# Patient Record
Sex: FEMALE | Race: ASIAN | NOT HISPANIC OR LATINO
[De-identification: names, ages, dates, MRNs, and addresses within clinical notes are randomized per-mention and may not be internally consistent; named-entity substitution may affect disease eponyms.]

---

## 2022-02-26 ENCOUNTER — ASOB RESULT (OUTPATIENT)
Age: 32
End: 2022-02-26

## 2022-02-26 ENCOUNTER — TRANSCRIPTION ENCOUNTER (OUTPATIENT)
Age: 32
End: 2022-02-26

## 2022-02-26 ENCOUNTER — APPOINTMENT (OUTPATIENT)
Dept: ANTEPARTUM | Facility: CLINIC | Age: 32
End: 2022-02-26
Payer: COMMERCIAL

## 2022-02-26 PROCEDURE — 76801 OB US < 14 WKS SINGLE FETUS: CPT

## 2022-02-26 PROCEDURE — 76813 OB US NUCHAL MEAS 1 GEST: CPT

## 2022-02-26 PROCEDURE — 36416 COLLJ CAPILLARY BLOOD SPEC: CPT

## 2022-03-11 PROBLEM — Z00.00 ENCOUNTER FOR PREVENTIVE HEALTH EXAMINATION: Status: ACTIVE | Noted: 2022-03-11

## 2022-04-26 ENCOUNTER — APPOINTMENT (OUTPATIENT)
Dept: ANTEPARTUM | Facility: CLINIC | Age: 32
End: 2022-04-26
Payer: COMMERCIAL

## 2022-04-26 ENCOUNTER — ASOB RESULT (OUTPATIENT)
Age: 32
End: 2022-04-26

## 2022-04-26 DIAGNOSIS — Z82.79 FAMILY HISTORY OF OTHER CONGENITAL MALFORMATIONS, DEFORMATIONS AND CHROMOSOMAL ABNORMALITIES: ICD-10-CM

## 2022-04-26 PROCEDURE — 76811 OB US DETAILED SNGL FETUS: CPT

## 2022-05-16 ENCOUNTER — OUTPATIENT (OUTPATIENT)
Dept: OUTPATIENT SERVICES | Age: 32
LOS: 1 days | Discharge: HOME | End: 2022-05-16

## 2022-05-18 ENCOUNTER — APPOINTMENT (OUTPATIENT)
Dept: PEDIATRIC CARDIOLOGY | Facility: CLINIC | Age: 32
End: 2022-05-18
Payer: COMMERCIAL

## 2022-05-18 ENCOUNTER — APPOINTMENT (OUTPATIENT)
Dept: PEDIATRIC CARDIOLOGY | Facility: CLINIC | Age: 32
End: 2022-05-18

## 2022-05-18 PROCEDURE — 76827 ECHO EXAM OF FETAL HEART: CPT

## 2022-05-18 PROCEDURE — 93325 DOPPLER ECHO COLOR FLOW MAPG: CPT | Mod: 59

## 2022-05-18 PROCEDURE — 76825 ECHO EXAM OF FETAL HEART: CPT

## 2022-05-18 PROCEDURE — 99203 OFFICE O/P NEW LOW 30 MIN: CPT

## 2022-05-18 PROCEDURE — 76820 UMBILICAL ARTERY ECHO: CPT

## 2022-06-15 ENCOUNTER — APPOINTMENT (OUTPATIENT)
Dept: MATERNAL FETAL MEDICINE | Facility: CLINIC | Age: 32
End: 2022-06-15
Payer: COMMERCIAL

## 2022-06-15 ENCOUNTER — ASOB RESULT (OUTPATIENT)
Age: 32
End: 2022-06-15

## 2022-06-15 DIAGNOSIS — O24.410 GESTATIONAL DIABETES MELLITUS IN PREGNANCY, DIET CONTROLLED: ICD-10-CM

## 2022-06-15 PROCEDURE — G0108 DIAB MANAGE TRN  PER INDIV: CPT | Mod: 95

## 2022-07-06 ENCOUNTER — APPOINTMENT (OUTPATIENT)
Dept: MATERNAL FETAL MEDICINE | Facility: CLINIC | Age: 32
End: 2022-07-06

## 2022-07-06 ENCOUNTER — ASOB RESULT (OUTPATIENT)
Age: 32
End: 2022-07-06

## 2022-07-06 PROCEDURE — G0108 DIAB MANAGE TRN  PER INDIV: CPT | Mod: 95

## 2022-07-27 ENCOUNTER — ASOB RESULT (OUTPATIENT)
Age: 32
End: 2022-07-27

## 2022-07-27 ENCOUNTER — APPOINTMENT (OUTPATIENT)
Dept: MATERNAL FETAL MEDICINE | Facility: CLINIC | Age: 32
End: 2022-07-27

## 2022-07-27 PROCEDURE — G0108 DIAB MANAGE TRN  PER INDIV: CPT | Mod: 95

## 2022-08-17 ENCOUNTER — APPOINTMENT (OUTPATIENT)
Dept: MATERNAL FETAL MEDICINE | Facility: CLINIC | Age: 32
End: 2022-08-17

## 2022-08-17 ENCOUNTER — ASOB RESULT (OUTPATIENT)
Age: 32
End: 2022-08-17

## 2022-08-17 PROCEDURE — G0108 DIAB MANAGE TRN  PER INDIV: CPT | Mod: 95

## 2022-08-28 ENCOUNTER — OUTPATIENT (OUTPATIENT)
Dept: INPATIENT UNIT | Facility: HOSPITAL | Age: 32
LOS: 1 days | Discharge: HOME | End: 2022-08-28

## 2022-08-28 VITALS
SYSTOLIC BLOOD PRESSURE: 94 MMHG | RESPIRATION RATE: 16 BRPM | TEMPERATURE: 99 F | DIASTOLIC BLOOD PRESSURE: 61 MMHG | HEART RATE: 72 BPM

## 2022-08-28 VITALS — HEART RATE: 82 BPM | SYSTOLIC BLOOD PRESSURE: 95 MMHG | DIASTOLIC BLOOD PRESSURE: 56 MMHG

## 2022-08-28 DIAGNOSIS — O26.899 OTHER SPECIFIED PREGNANCY RELATED CONDITIONS, UNSPECIFIED TRIMESTER: ICD-10-CM

## 2022-08-28 DIAGNOSIS — Z3A.00 WEEKS OF GESTATION OF PREGNANCY NOT SPECIFIED: ICD-10-CM

## 2022-08-28 PROCEDURE — 99213 OFFICE O/P EST LOW 20 MIN: CPT

## 2022-08-28 PROCEDURE — 76818 FETAL BIOPHYS PROFILE W/NST: CPT | Mod: 26

## 2022-08-28 NOTE — OB PROVIDER TRIAGE NOTE - HISTORY OF PRESENT ILLNESS
This is a 31 year old  at 37.5 weeks gestational age presents from [facility] for prolong monitoring due to nonreactive NST in office ( 1 10x10 accel as per RN report- no physical hardcopy to review) and DORON today on 7.9 ( previously 16 on ). Pt reports +GFM, denies LOF, VB or contractions.   AP course complicated by:  - GDMA1, well controlled as per patient, fasting FSs 70's, postprandial 100s, most recent efw 6 lbs last week  - Fetal Alert: Due to family history of congenital heart disease (previous child with VSD), a nonurgent  cardiology evaluation is recommended in 2-3 months after birth, sooner if there is a clinical concern.   - GBS neg    med: hitesh  surg: hitesh  gyn: coreyies  ob: 5/3/2009 37 week  6 lbs, NICU for VSD  current meds: pnv  NKDA  This is a 31 year old  at 37.5 weeks gestational age presents from [facility] for prolong monitoring due to nonreactive NST in office (only 1 10x10 accel as per RN report- no physical hardcopy to review) and DORON today on 7.9 ( previously 16 on ). Pt reports +GFM, denies LOF, VB or contractions.   AP course complicated by:  - GDMA1, well controlled as per patient, fasting FSs 70's, postprandial 100s, most recent efw 6 lbs last week  - Fetal Alert: Due to family history of congenital heart disease (previous child with VSD), a nonurgent  cardiology evaluation is recommended in 2-3 months after birth, sooner if there is a clinical concern.   - GBS neg    med: hitesh  surg: hitesh  gyn: hitesh  ob: 5/3/2009 37 week  6 lbs, NICU for VSD  current meds: pnv  NKDA

## 2022-08-28 NOTE — OB RN TRIAGE NOTE - NSMATERNALFETALCONCERNS_OBGYN_ALL_OB_FT
Fetal Alert  8.10.22: Due to familyhistory of congenital heart disease (previous child with VSD), a nonurgent  cardiology evaluation is recommended in 2-3 months after birth, sooner if there is a clinical concern. Elizabet Elliott RN

## 2022-08-28 NOTE — OB PROVIDER TRIAGE NOTE - NSHPPHYSICALEXAM_GEN_ALL_CORE
Vital Signs Last 24 Hrs  T(C): --  T(F): --  HR: 72 (28 Aug 2022 12:29) (72 - 72)  BP: 94/61 (28 Aug 2022 12:29) (94/61 - 94/61)  BP(mean): --  RR: --  SpO2: --    Parameters below as of 28 Aug 2022 11:56  Patient On (Oxygen Delivery Method): room air    A&O X3  CTAB  abdomen: gravid, soft, nontender  TAS: vtx anterior placenta bpp 8/8, jody 15.95  NST in progress Vital Signs Last 24 Hrs  T(C): --  T(F): --  HR: 72 (28 Aug 2022 12:29) (72 - 72)  BP: 94/61 (28 Aug 2022 12:29) (94/61 - 94/61)  BP(mean): --  RR: --  SpO2: --    Parameters below as of 28 Aug 2022 11:56  Patient On (Oxygen Delivery Method): room air    A&O X3  CTAB  abdomen: gravid, soft, nontender  TAS: vtx anterior placenta bpp 8/8, jody 15.95  NST in progress  130 baseline, moderate variability + accels, no decels, mild irregular contractions.

## 2022-08-28 NOTE — OB PROVIDER TRIAGE NOTE - ADDITIONAL INSTRUCTIONS
This is a 31 year old  at 37.5 weeks gestational age presents for prolong monitoring    Plan discussed with Dr Durbin  maternal/ fetal surveillance reassuring  DORON stable, NST reactive  labor precautions reviewed with patient  continue fetal kick counts, rest and activity as tolerated, increase PO fluid  follow up with [facility] as scheduled  pt verbalized understanding of instructions given  discharged home

## 2022-08-30 ENCOUNTER — OUTPATIENT (OUTPATIENT)
Dept: INPATIENT UNIT | Facility: HOSPITAL | Age: 32
LOS: 1 days | Discharge: HOME | End: 2022-08-30

## 2022-08-30 VITALS — DIASTOLIC BLOOD PRESSURE: 53 MMHG | HEART RATE: 69 BPM | SYSTOLIC BLOOD PRESSURE: 93 MMHG

## 2022-08-30 VITALS — TEMPERATURE: 98 F

## 2022-08-30 DIAGNOSIS — O26.899 OTHER SPECIFIED PREGNANCY RELATED CONDITIONS, UNSPECIFIED TRIMESTER: ICD-10-CM

## 2022-08-30 DIAGNOSIS — Z3A.00 WEEKS OF GESTATION OF PREGNANCY NOT SPECIFIED: ICD-10-CM

## 2022-08-30 NOTE — OB PROVIDER TRIAGE NOTE - NSOBPROVIDERNOTE_OBGYN_ALL_OB_FT
32 y/o  GDMA1 at 38 weeks contractions every 10minutes, R/O labor  - No evidence of labor, cervical exam 3  - Patient stable for discharge home with adequate outpatient follow up  - Instructed patient to follow up with OB/GYN as scheduled  - Educated and discussed labor precautions  - Return to L&D if contractions worsen and/or become more frequent  - Educated and discussed concerning signs/symptoms to call OB/GYN and return to L&D including but not limited to vaginal bleeding, leakage of fluid, painful contractions, decreased fetal movement, fever/chills, shortness of breath, chest pain, rash, difficulty ambulating, nausea/vomiting/diarrhea, worsening of symptoms  - Patient states she understands and agrees with assessment and plan, all questions answered  - Discussed with Dr. Garcia

## 2022-08-30 NOTE — OB PROVIDER TRIAGE NOTE - HISTORY OF PRESENT ILLNESS
32 y/o  GDMA1 at 38 weeks presents with contractions every 10 minutes since 3pm. Patient reports good fetal movement, denies leakage of fluid, vaginal bleeding. Denies any other AP issues or fetal issues.  GBS negative    Med Hx: denies  Meds: PNV, vitamin D, iron  OBGYN Hx: GDMA1   2019 FT 6 lbs  Surg Hx: denies  Allergies: denies

## 2022-08-30 NOTE — OB RN TRIAGE NOTE - FALL HARM RISK - UNIVERSAL INTERVENTIONS
Bed in lowest position, wheels locked, appropriate side rails in place/Call bell, personal items and telephone in reach/Instruct patient to call for assistance before getting out of bed or chair/Non-slip footwear when patient is out of bed/[place] to call system/Physically safe environment - no spills, clutter or unnecessary equipment/Purposeful Proactive Rounding/Room/bathroom lighting operational, light cord in reach

## 2022-08-30 NOTE — OB PROVIDER TRIAGE NOTE - NSHPPHYSICALEXAM_GEN_ALL_CORE
Vital Signs Last 24 Hrs  T(C): 36.6 (30 Aug 2022 05:07), Max: 36.6 (30 Aug 2022 05:02)  T(F): 97.9 (30 Aug 2022 05:07), Max: 97.9 (30 Aug 2022 05:07)  HR: 69 (30 Aug 2022 06:14) (68 - 69)  BP: 93/53 (30 Aug 2022 06:14) (93/53 - 97/55)  BP(mean): --  RR: 17 (30 Aug 2022 05:07) (17 - 17)  SpO2: --    Parameters below as of 30 Aug 2022 05:07  Patient On (Oxygen Delivery Method): room air    A&O x 3  Lungs: CTAB  Abd: gravid, soft nontender to palpation  EFM: [id number], moderate variability, + accels, no decels, ctx q10min  SVE: 1/30/-3  TAS: vertex presentation

## 2022-08-31 ENCOUNTER — INPATIENT (INPATIENT)
Facility: HOSPITAL | Age: 32
LOS: 1 days | Discharge: HOME | End: 2022-09-02
Attending: STUDENT IN AN ORGANIZED HEALTH CARE EDUCATION/TRAINING PROGRAM | Admitting: STUDENT IN AN ORGANIZED HEALTH CARE EDUCATION/TRAINING PROGRAM

## 2022-08-31 ENCOUNTER — TRANSCRIPTION ENCOUNTER (OUTPATIENT)
Age: 32
End: 2022-08-31

## 2022-08-31 VITALS
SYSTOLIC BLOOD PRESSURE: 118 MMHG | RESPIRATION RATE: 16 BRPM | HEART RATE: 71 BPM | TEMPERATURE: 98 F | DIASTOLIC BLOOD PRESSURE: 65 MMHG

## 2022-08-31 DIAGNOSIS — Z3A.00 WEEKS OF GESTATION OF PREGNANCY NOT SPECIFIED: ICD-10-CM

## 2022-08-31 DIAGNOSIS — O26.899 OTHER SPECIFIED PREGNANCY RELATED CONDITIONS, UNSPECIFIED TRIMESTER: ICD-10-CM

## 2022-08-31 LAB
BASOPHILS # BLD AUTO: 0.04 K/UL — SIGNIFICANT CHANGE UP (ref 0–0.2)
BASOPHILS NFR BLD AUTO: 0.4 % — SIGNIFICANT CHANGE UP (ref 0–2)
BLD GP AB SCN SERPL QL: NEGATIVE — SIGNIFICANT CHANGE UP
COVID-19 SPIKE DOMAIN AB INTERP: POSITIVE
COVID-19 SPIKE DOMAIN ANTIBODY RESULT: >250 U/ML — HIGH
EOSINOPHIL # BLD AUTO: 0.1 K/UL — SIGNIFICANT CHANGE UP (ref 0–0.5)
EOSINOPHIL NFR BLD AUTO: 0.9 % — SIGNIFICANT CHANGE UP (ref 0–6)
GLUCOSE BLDC GLUCOMTR-MCNC: 111 MG/DL — HIGH (ref 70–99)
HCT VFR BLD CALC: 38.6 % — SIGNIFICANT CHANGE UP (ref 34.5–45)
HGB BLD-MCNC: 12.7 G/DL — SIGNIFICANT CHANGE UP (ref 11.5–15.5)
IANC: 8.38 K/UL — HIGH (ref 1.8–7.4)
IMM GRANULOCYTES NFR BLD AUTO: 1.2 % — SIGNIFICANT CHANGE UP (ref 0–1.5)
LYMPHOCYTES # BLD AUTO: 1.43 K/UL — SIGNIFICANT CHANGE UP (ref 1–3.3)
LYMPHOCYTES # BLD AUTO: 13.5 % — SIGNIFICANT CHANGE UP (ref 13–44)
MCHC RBC-ENTMCNC: 32.9 GM/DL — SIGNIFICANT CHANGE UP (ref 32–36)
MCHC RBC-ENTMCNC: 33.9 PG — SIGNIFICANT CHANGE UP (ref 27–34)
MCV RBC AUTO: 102.9 FL — HIGH (ref 80–100)
MONOCYTES # BLD AUTO: 0.52 K/UL — SIGNIFICANT CHANGE UP (ref 0–0.9)
MONOCYTES NFR BLD AUTO: 4.9 % — SIGNIFICANT CHANGE UP (ref 2–14)
NEUTROPHILS # BLD AUTO: 8.38 K/UL — HIGH (ref 1.8–7.4)
NEUTROPHILS NFR BLD AUTO: 79.1 % — HIGH (ref 43–77)
NRBC # BLD: 0 /100 WBCS — SIGNIFICANT CHANGE UP (ref 0–0)
NRBC # FLD: 0 K/UL — SIGNIFICANT CHANGE UP (ref 0–0)
PLATELET # BLD AUTO: 159 K/UL — SIGNIFICANT CHANGE UP (ref 150–400)
RBC # BLD: 3.75 M/UL — LOW (ref 3.8–5.2)
RBC # FLD: 13.2 % — SIGNIFICANT CHANGE UP (ref 10.3–14.5)
RH IG SCN BLD-IMP: POSITIVE — SIGNIFICANT CHANGE UP
RH IG SCN BLD-IMP: POSITIVE — SIGNIFICANT CHANGE UP
SARS-COV-2 IGG+IGM SERPL QL IA: >250 U/ML — HIGH
SARS-COV-2 IGG+IGM SERPL QL IA: POSITIVE
SARS-COV-2 RNA SPEC QL NAA+PROBE: SIGNIFICANT CHANGE UP
T PALLIDUM AB TITR SER: NEGATIVE — SIGNIFICANT CHANGE UP
WBC # BLD: 10.6 K/UL — HIGH (ref 3.8–10.5)
WBC # FLD AUTO: 10.6 K/UL — HIGH (ref 3.8–10.5)

## 2022-08-31 RX ORDER — HYDROCORTISONE 1 %
1 OINTMENT (GRAM) TOPICAL EVERY 6 HOURS
Refills: 0 | Status: DISCONTINUED | OUTPATIENT
Start: 2022-08-31 | End: 2022-09-02

## 2022-08-31 RX ORDER — BENZOCAINE 10 %
1 GEL (GRAM) MUCOUS MEMBRANE EVERY 6 HOURS
Refills: 0 | Status: DISCONTINUED | OUTPATIENT
Start: 2022-08-31 | End: 2022-09-02

## 2022-08-31 RX ORDER — OXYTOCIN 10 UNIT/ML
333.33 VIAL (ML) INJECTION
Qty: 20 | Refills: 0 | Status: COMPLETED | OUTPATIENT
Start: 2022-08-31 | End: 2022-08-31

## 2022-08-31 RX ORDER — LANOLIN
1 OINTMENT (GRAM) TOPICAL EVERY 6 HOURS
Refills: 0 | Status: DISCONTINUED | OUTPATIENT
Start: 2022-08-31 | End: 2022-09-02

## 2022-08-31 RX ORDER — AER TRAVELER 0.5 G/1
1 SOLUTION RECTAL; TOPICAL EVERY 4 HOURS
Refills: 0 | Status: DISCONTINUED | OUTPATIENT
Start: 2022-08-31 | End: 2022-09-02

## 2022-08-31 RX ORDER — MAGNESIUM HYDROXIDE 400 MG/1
30 TABLET, CHEWABLE ORAL
Refills: 0 | Status: DISCONTINUED | OUTPATIENT
Start: 2022-08-31 | End: 2022-09-02

## 2022-08-31 RX ORDER — SODIUM CHLORIDE 9 MG/ML
3 INJECTION INTRAMUSCULAR; INTRAVENOUS; SUBCUTANEOUS EVERY 8 HOURS
Refills: 0 | Status: DISCONTINUED | OUTPATIENT
Start: 2022-08-31 | End: 2022-09-02

## 2022-08-31 RX ORDER — DIPHENHYDRAMINE HCL 50 MG
25 CAPSULE ORAL EVERY 6 HOURS
Refills: 0 | Status: DISCONTINUED | OUTPATIENT
Start: 2022-08-31 | End: 2022-09-02

## 2022-08-31 RX ORDER — DIBUCAINE 1 %
1 OINTMENT (GRAM) RECTAL EVERY 6 HOURS
Refills: 0 | Status: DISCONTINUED | OUTPATIENT
Start: 2022-08-31 | End: 2022-09-02

## 2022-08-31 RX ORDER — CHLORHEXIDINE GLUCONATE 213 G/1000ML
1 SOLUTION TOPICAL ONCE
Refills: 0 | Status: DISCONTINUED | OUTPATIENT
Start: 2022-08-31 | End: 2022-08-31

## 2022-08-31 RX ORDER — PRAMOXINE HYDROCHLORIDE 150 MG/15G
1 AEROSOL, FOAM RECTAL EVERY 4 HOURS
Refills: 0 | Status: DISCONTINUED | OUTPATIENT
Start: 2022-08-31 | End: 2022-09-02

## 2022-08-31 RX ORDER — SIMETHICONE 80 MG/1
80 TABLET, CHEWABLE ORAL EVERY 4 HOURS
Refills: 0 | Status: DISCONTINUED | OUTPATIENT
Start: 2022-08-31 | End: 2022-09-02

## 2022-08-31 RX ORDER — CITRIC ACID/SODIUM CITRATE 300-500 MG
15 SOLUTION, ORAL ORAL EVERY 6 HOURS
Refills: 0 | Status: DISCONTINUED | OUTPATIENT
Start: 2022-08-31 | End: 2022-08-31

## 2022-08-31 RX ORDER — SODIUM CHLORIDE 9 MG/ML
1000 INJECTION, SOLUTION INTRAVENOUS ONCE
Refills: 0 | Status: COMPLETED | OUTPATIENT
Start: 2022-08-31 | End: 2022-08-31

## 2022-08-31 RX ORDER — TETANUS TOXOID, REDUCED DIPHTHERIA TOXOID AND ACELLULAR PERTUSSIS VACCINE, ADSORBED 5; 2.5; 8; 8; 2.5 [IU]/.5ML; [IU]/.5ML; UG/.5ML; UG/.5ML; UG/.5ML
0.5 SUSPENSION INTRAMUSCULAR ONCE
Refills: 0 | Status: DISCONTINUED | OUTPATIENT
Start: 2022-08-31 | End: 2022-09-02

## 2022-08-31 RX ORDER — SODIUM CHLORIDE 9 MG/ML
1000 INJECTION, SOLUTION INTRAVENOUS
Refills: 0 | Status: DISCONTINUED | OUTPATIENT
Start: 2022-08-31 | End: 2022-08-31

## 2022-08-31 RX ADMIN — SODIUM CHLORIDE 2000 MILLILITER(S): 9 INJECTION, SOLUTION INTRAVENOUS at 22:45

## 2022-08-31 RX ADMIN — SODIUM CHLORIDE 3 MILLILITER(S): 9 INJECTION INTRAMUSCULAR; INTRAVENOUS; SUBCUTANEOUS at 22:45

## 2022-08-31 RX ADMIN — Medication 1000 MILLIUNIT(S)/MIN: at 14:43

## 2022-08-31 NOTE — DISCHARGE NOTE OB - NS MD DC FALL RISK RISK
For information on Fall & Injury Prevention, visit: [email or web address] OR  [email or web address] OR  https://www.cdc.gov/steadi/patient.html

## 2022-08-31 NOTE — OB PROVIDER TRIAGE NOTE - NS_OBGYNHISTORY_OBGYN_ALL_OB_FT
2019 FT 6 lbs- baby with small VSD- no surgery    AP course complicated by:  GDMA1  Fetal echo WNL- post  follow up in 2-3 weeks  GBS negative 2022  EFW 6lbs 2 weeks ago

## 2022-08-31 NOTE — OB RN TRIAGE NOTE - FALL HARM RISK - UNIVERSAL INTERVENTIONS
Bed in lowest position, wheels locked, appropriate side rails in place/Call bell, personal items and telephone in reach/Instruct patient to call for assistance before getting out of bed or chair/Non-slip footwear when patient is out of bed/[place] to call system/Physically safe environment - no spills, clutter or unnecessary equipment/Purposeful Proactive Rounding/Room/bathroom lighting operational, light cord in reach

## 2022-08-31 NOTE — OB RN DELIVERY SUMMARY - BABY A: APGAR 5 MIN COLOR, DELIVERY
RN received report assumed patient care. Patient is sleeping at this time. Cardiac monitor in place. Octreotide gtt infusing. Patient is in bilateral wrist restraints due to attempts to pull at vital lines. Cortrak in place, pt has been NPO since midnight for possible procedure. RN will round on pt frequently due to confusion and restraints.    (1) body pink, extremities blue

## 2022-08-31 NOTE — OB PROVIDER H&P - NSHPPHYSICALEXAM_GEN_ALL_CORE
Assessment reveals VSS, abdomen soft, NT, gravid.   VE 7/90/-2  Vtx confirmed by sono  Cat 1 FHT, ctx 2-5 on toco  Patient desires epidural

## 2022-08-31 NOTE — DISCHARGE NOTE OB - HOSPITAL COURSE
precipitous Spontaneous vaginal delivery of viable liveborn male infant.   Head, shoulders, and body delivered without complications. Cord around the neck x 2.   Infant was suctioned and passed to mother.   Delayed cord clamping performed, then clamped and cut.   Placenta delivered intact with a 3-vessel cord.   Fundal massage was given and uterine fundus was noted to be firm.   Vaginal exam revealed an intact cervix, vaginal walls, and sulci.   Patient has a 2nd degree laceration that was repaired with 2-0 chromic suture.   Excellent hemostasis noted.  Patient was stable and started postpartum recovery in room.   Count was correct x 2.     Infant: male  Apgar: 8/9  EBL: 213ml  weight: 3125g. 6#14

## 2022-08-31 NOTE — OB NEONATOLOGY/PEDIATRICIAN DELIVERY SUMMARY - NSPEDSNEONOTESA_OBGYN_ALL_OB_FT
Called by OBGYN to attend  due to cat II FHT. Baby is product of a 38.1 week gestation born to a  31 y.o. F. Maternal labs include Blood Type A+, GBS-, Hep B-, RPR-, Rubella imm, HIV-. Maternal history is non-significant though previous child with VSD. Fetal echo WNL. Pregnancy was complicated by GDM. AROM on 22 at 1120 with clear fluid, at approximately 1 hour. Delivery uncomplicated. Baby emerged slightly depressed but became vigorous with stimulation. Delayed cord clamping x30s performed. Resuscitation included w/d/s/s. Apgars were 8/9. EOS score: 0.05. Feeding: Breast and bottle. Consents to hep B. Declines circumcision. Admit to NBN.

## 2022-08-31 NOTE — OB PROVIDER H&P - ASSESSMENT
Admit for labor  Epidural for pain management  COVID-19 PCR  Expectant management at this time  EFM/TOCO  D/W Dr. Torres

## 2022-08-31 NOTE — CHART NOTE - NSCHARTNOTEFT_GEN_A_CORE
pt was evaluated at bedside   ve: 7/90/-2 AROM clear   cat 1 tracing   pitocin augmentation prn   peanut   anticipate vaginal delivery

## 2022-08-31 NOTE — OB RN PATIENT PROFILE - NS_CIRCUMCISIONPLAN_OBGYN_ALL_OB
conducted a Emergency Department visit for Jorge Sauceda, who is a 82 y.o.,female. The  provided the following Interventions:  Continued the relationship of care and support. Listened empathically. Offered prayer and assurance of continued prayer on patients behalf. Chart reviewed. The following outcomes were achieved:  Patient shared that family had just left. Patient expressed gratitude for 's visit. Plan:  Chaplains will continue to follow and will provide pastoral care on an as needed/requested basis.  recommends bedside caregivers page  on duty if patient shows signs of acute spiritual or emotional distress.        [facility]  [phone] No

## 2022-08-31 NOTE — OB RN DELIVERY SUMMARY - NS_SEPSISRSKCALC_OBGYN_ALL_OB_FT
EOS calculated successfully. EOS Risk Factor: 0.5/1000 live births ([facility] national incidence); GA=38w1d; Temp=98.42; ROM=0.65; GBS='Negative'; Antibiotics='No antibiotics or any antibiotics < 2 hrs prior to birth'

## 2022-08-31 NOTE — DISCHARGE NOTE OB - MEDICATION SUMMARY - MEDICATIONS TO TAKE
I will START or STAY ON the medications listed below when I get home from the hospital:    acetaminophen 325 mg oral tablet  -- 3 tab(s) by mouth every 6 hours, As Needed  -- Indication: For pain    ibuprofen 600 mg oral tablet  -- 1 tab(s) by mouth every 6 hours, As Needed  -- Indication: For pain    benzocaine 20% topical spray  -- 1 spray(s) on skin every 6 hours, As needed, for Perineal discomfort  -- Indication: For Spontaneous vaginal delivery    dibucaine 1% topical ointment  -- 1 application on skin every 6 hours, As needed, Perineal discomfort  -- Indication: For Spontaneous vaginal delivery    lanolin topical ointment  -- 1 application on skin every 6 hours, As needed, nipple soreness  -- Indication: For Nipple soreness    hydrocortisone 1% topical cream  -- 1 application on skin every 6 hours, As needed, Moderate Pain (4-6)  -- Indication: For Spontaneous vaginal delivery    pramoxine 1% topical cream  -- 1 application on skin every 4 hours, As needed, Moderate Pain (4-6)  -- Indication: For Spontaneous vaginal delivery    witch hazel 50% topical pad  -- 1 application on skin every 4 hours, As needed, Perineal discomfort  -- Indication: For Spontaneous vaginal delivery

## 2022-08-31 NOTE — OB PROVIDER TRIAGE NOTE - HISTORY OF PRESENT ILLNESS
32yo  @ 38.1 presents with c/o painful contractions every 5 minutes. Denies LOF, VB and reports GFM.  Fully vaccinated for COVID-19    Denies PMH/PSH

## 2022-08-31 NOTE — OB RN PATIENT PROFILE - FALL HARM RISK - UNIVERSAL INTERVENTIONS
Bed in lowest position, wheels locked, appropriate side rails in place/Call bell, personal items and telephone in reach/Instruct patient to call for assistance before getting out of bed or chair/Non-slip footwear when patient is out of bed/[place] to call system/Physically safe environment - no spills, clutter or unnecessary equipment/Purposeful Proactive Rounding/Room/bathroom lighting operational, light cord in reach

## 2022-08-31 NOTE — OB PROVIDER DELIVERY SUMMARY - NSMATERNALFETALCONCERNS_OBGYN_ALL_OB_FT
Fetal Alert  8.10.22: Due to familyhistory of congenital heart disease (previous child with VSD), a nonurgent  cardiology evaluation is recommended in 2-3 months after birth, sooner if there is a clinical concern. Elizabet Elliott RN    
5

## 2022-08-31 NOTE — OB RN DELIVERY SUMMARY - NS_RNDELIVATTEST_OBGYN_ALL_OB
ambulatory OB Provider reported that the vagina was inspected and no foreign body was found/Laps, needles and instrument count was correct

## 2022-08-31 NOTE — DISCHARGE NOTE OB - MATERIALS PROVIDED
[facility] [place] Screening Program/[place]  Immunization Record/Breastfeeding Log/Bottle Feeding Log/Guide to Postpartum Care/[facility] Hearing Screen Program/Back To Sleep Handout/Shaken Baby Prevention Handout/Breastfeeding Guide and Packet/Birth Certificate Instructions/Discharge Medication Information for Patients and Families Pocket Guide

## 2022-08-31 NOTE — OB RN DELIVERY SUMMARY - NSSELHIDDEN_OBGYN_ALL_OB_FT
[NS_DeliveryAttending1_OBGYN_ALL_OB_FT:[email or web address]=],[NS_DeliveryRN_OBGYN_ALL_OB_FT:[id number]]

## 2022-08-31 NOTE — OB PROVIDER H&P - HISTORY OF PRESENT ILLNESS
30yo  @ 38.1 presents with c/o painful contractions every 5 minutes. Denies LOF, VB and reports GFM.  Fully vaccinated for COVID-19    Denies PMH/PSH

## 2022-08-31 NOTE — DISCHARGE NOTE OB - CARE PLAN
Principal Discharge DX:	Spontaneous vaginal delivery  Assessment and plan of treatment:	routine pp care   1

## 2022-08-31 NOTE — OB RN TRIAGE NOTE - NSCTXPAIN_OBGYN_ALL_OB
Assumed care of pt. Pt resting comfortably on stretcher. +nasal congestion. Pt able to move neck without difficulty. Education: Educated mom on Amoxicillin dosage and frequency. Mom verbalized understanding. Pt discharged home with parent/guardian. Pt acting age appropriately, respirations regular and unlabored, cap refill less than two seconds. Parent/guardian verbalized understanding of discharge paperwork and has no further questions at this time. Yes Alert-The patient is alert, awake and responds to voice. The patient is oriented to time, place, and person. The triage nurse is able to obtain subjective information.

## 2022-08-31 NOTE — DISCHARGE NOTE OB - PATIENT PORTAL LINK FT
You can access the FollowMyHealth Patient Portal offered by [facility] by registering at the following website: [email or web address]. By joining [facility]’s FollowMyHealth portal, you will also be able to view your health information using other applications (apps) compatible with our system.

## 2022-08-31 NOTE — OB RN PATIENT PROFILE - PRO PRENATAL LABS ORI SOURCE HIV
I spoke with pt wife. She stated that he has not had any CP. She states its theSOB or the fatigue which is so unlike him. She knows that something is wrong. He has not improved since he came out of the hospital. They would like the results to the monitor and see if appt can be moved up.  Wife wants it after May 23rd hard copy, drawn during this pregnancy

## 2022-08-31 NOTE — OB PROVIDER DELIVERY SUMMARY - NSSELHIDDEN_OBGYN_ALL_OB_FT
[NS_DeliveryAttending1_OBGYN_ALL_OB_FT:[email or web address]=],[NS_DeliveryRN_OBGYN_ALL_OB_FT:[id number]],[NS_DeliveryAssist1_OBGYN_ALL_OB_FT:[id number]]

## 2022-08-31 NOTE — DISCHARGE NOTE OB - CARE PROVIDER_API CALL
Katya Torres)  Obstetrics and Gynecology  [address] [place] [address]  Phone: [phone]  Fax: [phone]  Follow Up Time:

## 2022-08-31 NOTE — OB PROVIDER H&P - LABOR: CERVICAL POSITION
Spoke with pt and let her know that Dr. Amy Nagy recommends her to see an orthopedic doctor regarding her neck pain. She lives in [address] [place] [address] so she asked for a generalized orthopedic referral, and she will call her insurance company to see who is covered and take it there. There referral will be mailed to her home.
middle

## 2022-09-01 LAB
BASOPHILS # BLD AUTO: 0.04 K/UL — SIGNIFICANT CHANGE UP (ref 0–0.2)
BASOPHILS # BLD AUTO: 0.05 K/UL — SIGNIFICANT CHANGE UP (ref 0–0.2)
BASOPHILS NFR BLD AUTO: 0.3 % — SIGNIFICANT CHANGE UP (ref 0–2)
BASOPHILS NFR BLD AUTO: 0.4 % — SIGNIFICANT CHANGE UP (ref 0–2)
EOSINOPHIL # BLD AUTO: 0.15 K/UL — SIGNIFICANT CHANGE UP (ref 0–0.5)
EOSINOPHIL # BLD AUTO: 0.19 K/UL — SIGNIFICANT CHANGE UP (ref 0–0.5)
EOSINOPHIL NFR BLD AUTO: 1.2 % — SIGNIFICANT CHANGE UP (ref 0–6)
EOSINOPHIL NFR BLD AUTO: 1.5 % — SIGNIFICANT CHANGE UP (ref 0–6)
HCT VFR BLD CALC: 34 % — LOW (ref 34.5–45)
HCT VFR BLD CALC: 35.2 % — SIGNIFICANT CHANGE UP (ref 34.5–45)
HCT VFR BLD CALC: 35.2 % — SIGNIFICANT CHANGE UP (ref 34.5–45)
HGB BLD-MCNC: 11.3 G/DL — LOW (ref 11.5–15.5)
HGB BLD-MCNC: 11.7 G/DL — SIGNIFICANT CHANGE UP (ref 11.5–15.5)
HGB BLD-MCNC: 12.2 G/DL — SIGNIFICANT CHANGE UP (ref 11.5–15.5)
IANC: 9.17 K/UL — HIGH (ref 1.8–7.4)
IANC: 9.49 K/UL — HIGH (ref 1.8–7.4)
IMM GRANULOCYTES NFR BLD AUTO: 0.6 % — SIGNIFICANT CHANGE UP (ref 0–1.5)
IMM GRANULOCYTES NFR BLD AUTO: 0.8 % — SIGNIFICANT CHANGE UP (ref 0–1.5)
LYMPHOCYTES # BLD AUTO: 16.1 % — SIGNIFICANT CHANGE UP (ref 13–44)
LYMPHOCYTES # BLD AUTO: 18.9 % — SIGNIFICANT CHANGE UP (ref 13–44)
LYMPHOCYTES # BLD AUTO: 2.03 K/UL — SIGNIFICANT CHANGE UP (ref 1–3.3)
LYMPHOCYTES # BLD AUTO: 2.44 K/UL — SIGNIFICANT CHANGE UP (ref 1–3.3)
MCHC RBC-ENTMCNC: 32.1 GM/DL — SIGNIFICANT CHANGE UP (ref 32–36)
MCHC RBC-ENTMCNC: 33.2 PG — SIGNIFICANT CHANGE UP (ref 27–34)
MCHC RBC-ENTMCNC: 34.7 GM/DL — SIGNIFICANT CHANGE UP (ref 32–36)
MCHC RBC-ENTMCNC: 34.9 PG — HIGH (ref 27–34)
MCV RBC AUTO: 100.6 FL — HIGH (ref 80–100)
MCV RBC AUTO: 103.5 FL — HIGH (ref 80–100)
MONOCYTES # BLD AUTO: 0.8 K/UL — SIGNIFICANT CHANGE UP (ref 0–0.9)
MONOCYTES # BLD AUTO: 0.99 K/UL — HIGH (ref 0–0.9)
MONOCYTES NFR BLD AUTO: 6.3 % — SIGNIFICANT CHANGE UP (ref 2–14)
MONOCYTES NFR BLD AUTO: 7.7 % — SIGNIFICANT CHANGE UP (ref 2–14)
NEUTROPHILS # BLD AUTO: 9.17 K/UL — HIGH (ref 1.8–7.4)
NEUTROPHILS # BLD AUTO: 9.49 K/UL — HIGH (ref 1.8–7.4)
NEUTROPHILS NFR BLD AUTO: 70.8 % — SIGNIFICANT CHANGE UP (ref 43–77)
NEUTROPHILS NFR BLD AUTO: 75.4 % — SIGNIFICANT CHANGE UP (ref 43–77)
NRBC # BLD: 0 /100 WBCS — SIGNIFICANT CHANGE UP (ref 0–0)
NRBC # BLD: 0 /100 WBCS — SIGNIFICANT CHANGE UP (ref 0–0)
NRBC # FLD: 0 K/UL — SIGNIFICANT CHANGE UP (ref 0–0)
NRBC # FLD: 0 K/UL — SIGNIFICANT CHANGE UP (ref 0–0)
PLATELET # BLD AUTO: 164 K/UL — SIGNIFICANT CHANGE UP (ref 150–400)
PLATELET # BLD AUTO: 177 K/UL — SIGNIFICANT CHANGE UP (ref 150–400)
RBC # BLD: 3.4 M/UL — LOW (ref 3.8–5.2)
RBC # BLD: 3.5 M/UL — LOW (ref 3.8–5.2)
RBC # FLD: 13 % — SIGNIFICANT CHANGE UP (ref 10.3–14.5)
RBC # FLD: 13 % — SIGNIFICANT CHANGE UP (ref 10.3–14.5)
WBC # BLD: 12.6 K/UL — HIGH (ref 3.8–10.5)
WBC # BLD: 12.93 K/UL — HIGH (ref 3.8–10.5)
WBC # FLD AUTO: 12.6 K/UL — HIGH (ref 3.8–10.5)
WBC # FLD AUTO: 12.93 K/UL — HIGH (ref 3.8–10.5)

## 2022-09-01 RX ORDER — IBUPROFEN 200 MG
600 TABLET ORAL EVERY 6 HOURS
Refills: 0 | Status: COMPLETED | OUTPATIENT
Start: 2022-09-01 | End: 2023-07-31

## 2022-09-01 RX ORDER — OXYCODONE HYDROCHLORIDE 5 MG/1
5 TABLET ORAL ONCE
Refills: 0 | Status: DISCONTINUED | OUTPATIENT
Start: 2022-09-01 | End: 2022-09-02

## 2022-09-01 RX ORDER — ACETAMINOPHEN 500 MG
975 TABLET ORAL
Refills: 0 | Status: DISCONTINUED | OUTPATIENT
Start: 2022-09-01 | End: 2022-09-02

## 2022-09-01 RX ORDER — OXYCODONE HYDROCHLORIDE 5 MG/1
5 TABLET ORAL
Refills: 0 | Status: DISCONTINUED | OUTPATIENT
Start: 2022-09-01 | End: 2022-09-02

## 2022-09-01 RX ADMIN — SODIUM CHLORIDE 3 MILLILITER(S): 9 INJECTION INTRAMUSCULAR; INTRAVENOUS; SUBCUTANEOUS at 20:53

## 2022-09-01 RX ADMIN — Medication 1 TABLET(S): at 12:38

## 2022-09-01 RX ADMIN — SODIUM CHLORIDE 3 MILLILITER(S): 9 INJECTION INTRAMUSCULAR; INTRAVENOUS; SUBCUTANEOUS at 06:23

## 2022-09-01 RX ADMIN — Medication 975 MILLIGRAM(S): at 23:00

## 2022-09-01 RX ADMIN — Medication 0.2 MILLIGRAM(S): at 23:00

## 2022-09-01 NOTE — PROGRESS NOTE ADULT - SUBJECTIVE AND OBJECTIVE BOX
Post-partum Note,   She is a  31y woman who is now post-partum day: 1    Subjective:  The patient reports dizziness since delivery.   Denies headaches. Denies abd pain. Denies vision changes.   She is ambulating.   She is tolerating regular diet.  She denies nausea and vomiting; denies fever.  She is voiding.  Her pain is controlled.  She reports normal postpartum bleeding.  She is breastfeeding and bottle feeding.     Physical exam:    Vital Signs Last 24 Hrs  T(C): 36.7 (01 Sep 2022 05:50), Max: 36.8 (31 Aug 2022 18:13)  T(F): 98 (01 Sep 2022 05:50), Max: 98.3 (31 Aug 2022 18:13)  HR: 68 (01 Sep 2022 05:50) (57 - 163)  BP: 90/65 (01 Sep 2022 05:50) (81/51 - 104/76)  BP(mean): --  RR: 18 (01 Sep 2022 05:50) (16 - 18)  SpO2: 100% (01 Sep 2022 05:50) (92% - 100%)    Parameters below as of 01 Sep 2022 05:50  Patient On (Oxygen Delivery Method): room air        Gen: NAD  Breast: Soft, nontender, not engorged.  Abdomen: Soft, nontender, no distension , firm uterine fundus at umbilicus.  Pelvic: Normal lochia noted  Ext: No calf tenderness    LABS:                        11.7   x     )-----------( x        ( 01 Sep 2022 05:46 )             34.0       Rubella status:     Allergies    No Known Allergies    Intolerances      MEDICATIONS  (STANDING):  diphtheria/tetanus/pertussis (acellular) Vaccine (ADAcel) 0.5 milliLiter(s) IntraMuscular once  prenatal multivitamin 1 Tablet(s) Oral daily  sodium chloride 0.9% lock flush 3 milliLiter(s) IV Push every 8 hours    MEDICATIONS  (PRN):  benzocaine 20%/menthol 0.5% Spray 1 Spray(s) Topical every 6 hours PRN for Perineal discomfort  dibucaine 1% Ointment 1 Application(s) Topical every 6 hours PRN Perineal discomfort  diphenhydrAMINE 25 milliGRAM(s) Oral every 6 hours PRN Pruritus  hydrocortisone 1% Cream 1 Application(s) Topical every 6 hours PRN Moderate Pain (4-6)  lanolin Ointment 1 Application(s) Topical every 6 hours PRN nipple soreness  magnesium hydroxide Suspension 30 milliLiter(s) Oral two times a day PRN Constipation  pramoxine 1%/zinc 5% Cream 1 Application(s) Topical every 4 hours PRN Moderate Pain (4-6)  simethicone 80 milliGRAM(s) Chew every 4 hours PRN Gas  witch hazel Pads 1 Application(s) Topical every 4 hours PRN Perineal discomfort        Assessment and Plan  PPD #1 s/p  reporting dizziness      PP & PPD Instructions reviewed.  PP education materials provided.   Increase po hydration.   Small frequent meals.   Repeat CBC ordered.  Plan for D/C home tomorrow, pending CBC and patient status.   Plan reviewed with Dr. Torres.

## 2022-09-01 NOTE — PROVIDER CONTACT NOTE (OTHER) - ASSESSMENT
Fundus firm at umbilicus with light bleeding noted on pad. Pt denies any chest pain, shortness of breath or palpitations at this time.

## 2022-09-01 NOTE — PROVIDER CONTACT NOTE (OTHER) - ACTION/TREATMENT ORDERED:
Fast scan performed. Manual removal of clots.  VS [id number]. Given Methergine series per order

## 2022-09-01 NOTE — PROVIDER CONTACT NOTE (OTHER) - SITUATION
Patient complained of dizziness. BP check 95/66. Patient instructed to call for assistance. Patient stated she had blood clot in her pad around 4:30pm but she did not tell anybody until now

## 2022-09-01 NOTE — CHART NOTE - NSCHARTNOTEFT_GEN_A_CORE
32yo PPD#1 from  reporting passage of a large clot. Provider made aware. Provider immediately at bedside.     Pt states she has not been soaking >1 pad/hour. States she passed a "baseball" sized clot @5p on 22. Reports she was lying down, using her cellphone when she experienced sudden onset dizziness. She attempted to stabilize herself by repositioning when she felt a gush from her vagina. Pt denies HA, CP, SOB, N/V/D, lightheadedness, or syncope.    Vital Signs Last 24 Hrs  T(C): 36.3 (22 @ 22:07), Max: 36.8 (22 @ 18:18)  T(F): 97.3 (22 @ 22:07), Max: 98.3 (22 @ 18:18)  HR: 65 (22 @ 22:07) (60 - 76)  BP: 106/69 (22 @ 22:07) (89/56 - 106/69)  BP(mean): --  RR: 16 (22 @ 22:07) (16 - 18)  SpO2: 100% (22 @ 22:07) (99% - 100%)    Orthostatic VS  22 @ 21:57  Lying BP: 86/46 HR: 62  Sitting BP: 96/55 HR: 65  Standing BP: 94/54 HR: 73  Site: upper right arm  Mode: electronic  Orthostatic VS  22 @ 13:18  Lying BP: 93/60 HR: 65  Sitting BP: 97/57 HR: 66  Standing BP: 88/54 HR: 88    CBC Full  -  ( 01 Sep 2022 22:00 )  WBC Count : 12.93 K/uL  RBC Count : 3.40 M/uL  Hemoglobin : 11.3 g/dL  Hematocrit : 35.2 %  Platelet Count - Automated : 164 K/uL  Mean Cell Volume : 103.5 fL  Mean Cell Hemoglobin : 33.2 pg  Mean Cell Hemoglobin Concentration : 32.1 gm/dL    T(C): 36.3 (22 @ 22:07), Max: 36.8 (22 @ 18:18)  HR: 65 (22 @ 22:07) (60 - 76)  BP: 106/69 (22 @ 22:07) (89/56 - 106/69)  RR: 16 (22 @ 22:07) (16 - 18)  SpO2: 100% (22 @ 22:07) (99% - 100%)    CONSTITUTIONAL: Well appearing, no apparent distress  RESP: No respiratory distress, no use of accessory muscles; CTA b/l  CV: RRR, +S1S2, no MRG; no JVD; no peripheral edema  GI: Uterus firm, at the umbilicus, soft, appropriately tender, ND, no rebound, no guarding; no palpable masses  SKIN: No rashes or ulcers noted; no subcutaneous nodules or induration palpable    Pelvic U/S @bedside showed thickened uterine stripe.     32yo PPD#1 from  evaluated for uterine atony. Patient's VSS with U/S showing a thickened uterine stripe.   - Manual evacuation of clots performed by Dr. Gabrielle Shaver, PGY4  - Methergine PO started  - Ibuprofen, Tylenol, and Oxycodone ordered for pain relief    Discussed case w/ Dr. Pascual, Attending Physician     Yamile Lorenz PGY1 32yo PPD#1 from  reporting passage of a large clot. Provider made aware. Provider immediately at bedside.     Pt states she has not been soaking >1 pad/hour. States she passed a "baseball" sized clot @5p on 22. Reports she was lying down, using her cellphone when she experienced sudden onset dizziness. She attempted to stabilize herself by repositioning when she felt a gush from her vagina. Pt denies HA, CP, SOB, N/V/D, lightheadedness, or syncope.    Vital Signs Last 24 Hrs  T(C): 36.3 (22 @ 22:07), Max: 36.8 (22 @ 18:18)  T(F): 97.3 (22 @ 22:07), Max: 98.3 (22 @ 18:18)  HR: 65 (22 @ 22:07) (60 - 76)  BP: 106/69 (22 @ 22:07) (89/56 - 106/69)  BP(mean): --  RR: 16 (22 @ 22:07) (16 - 18)  SpO2: 100% (22 @ 22:07) (99% - 100%)    Orthostatic VS  22 @ 21:57  Lying BP: 86/46 HR: 62  Sitting BP: 96/55 HR: 65  Standing BP: 94/54 HR: 73  Site: upper right arm  Mode: electronic  Orthostatic VS  22 @ 13:18  Lying BP: 93/60 HR: 65  Sitting BP: 97/57 HR: 66  Standing BP: 88/54 HR: 88    CBC Full  -  ( 01 Sep 2022 22:00 )  WBC Count : 12.93 K/uL  RBC Count : 3.40 M/uL  Hemoglobin : 11.3 g/dL  Hematocrit : 35.2 %  Platelet Count - Automated : 164 K/uL  Mean Cell Volume : 103.5 fL  Mean Cell Hemoglobin : 33.2 pg  Mean Cell Hemoglobin Concentration : 32.1 gm/dL    T(C): 36.3 (22 @ 22:07), Max: 36.8 (22 @ 18:18)  HR: 65 (22 @ 22:07) (60 - 76)  BP: 106/69 (22 @ 22:07) (89/56 - 106/69)  RR: 16 (22 @ 22:07) (16 - 18)  SpO2: 100% (22 @ 22:07) (99% - 100%)    CONSTITUTIONAL: Well appearing, no apparent distress  RESP: No respiratory distress, no use of accessory muscles; CTA b/l  CV: RRR, +S1S2, no MRG; no JVD; no peripheral edema  GI: Uterus firm, at the umbilicus, soft, appropriately tender, ND, no rebound, no guarding; no palpable masses  SKIN: No rashes or ulcers noted; no subcutaneous nodules or induration palpable    Pelvic U/S @bedside showed thickened uterine stripe.     32yo PPD#1 from  evaluated for uterine atony. Patient's VSS with U/S showing a thickened uterine stripe.   - Manual evacuation of clots performed by Dr. Gabrielle Shaver, PGY4  - Methergine PO started  - Ibuprofen, Tylenol, and Oxycodone ordered for pain relief  - Stat CBC pending    Discussed case w/ Dr. Pascual, Attending Physician     Yamile Lorenz PGY1

## 2022-09-02 VITALS
SYSTOLIC BLOOD PRESSURE: 122 MMHG | DIASTOLIC BLOOD PRESSURE: 75 MMHG | OXYGEN SATURATION: 100 % | RESPIRATION RATE: 18 BRPM | HEART RATE: 80 BPM | TEMPERATURE: 98 F

## 2022-09-02 RX ORDER — HYDROCORTISONE 1 %
1 OINTMENT (GRAM) TOPICAL
Qty: 0 | Refills: 0 | DISCHARGE
Start: 2022-09-02

## 2022-09-02 RX ORDER — PRAMOXINE HYDROCHLORIDE 150 MG/15G
1 AEROSOL, FOAM RECTAL
Qty: 0 | Refills: 0 | DISCHARGE
Start: 2022-09-02

## 2022-09-02 RX ORDER — ACETAMINOPHEN 500 MG
3 TABLET ORAL
Qty: 0 | Refills: 0 | DISCHARGE
Start: 2022-09-02

## 2022-09-02 RX ORDER — DIBUCAINE 1 %
1 OINTMENT (GRAM) RECTAL
Qty: 0 | Refills: 0 | DISCHARGE
Start: 2022-09-02

## 2022-09-02 RX ORDER — AER TRAVELER 0.5 G/1
1 SOLUTION RECTAL; TOPICAL
Qty: 0 | Refills: 0 | DISCHARGE
Start: 2022-09-02

## 2022-09-02 RX ORDER — LANOLIN
1 OINTMENT (GRAM) TOPICAL
Qty: 0 | Refills: 0 | DISCHARGE
Start: 2022-09-02

## 2022-09-02 RX ORDER — IBUPROFEN 200 MG
600 TABLET ORAL EVERY 6 HOURS
Refills: 0 | Status: DISCONTINUED | OUTPATIENT
Start: 2022-09-02 | End: 2022-09-02

## 2022-09-02 RX ORDER — ERGOCALCIFEROL 1.25 MG/1
0 CAPSULE ORAL
Qty: 0 | Refills: 0 | DISCHARGE

## 2022-09-02 RX ORDER — IBUPROFEN 200 MG
1 TABLET ORAL
Qty: 0 | Refills: 0 | DISCHARGE
Start: 2022-09-02

## 2022-09-02 RX ORDER — FERROUS SULFATE 325(65) MG
0 TABLET ORAL
Qty: 0 | Refills: 0 | DISCHARGE

## 2022-09-02 RX ORDER — BENZOCAINE 10 %
1 GEL (GRAM) MUCOUS MEMBRANE
Qty: 0 | Refills: 0 | DISCHARGE
Start: 2022-09-02

## 2022-09-02 RX ADMIN — Medication 975 MILLIGRAM(S): at 00:00

## 2022-09-02 RX ADMIN — SODIUM CHLORIDE 3 MILLILITER(S): 9 INJECTION INTRAMUSCULAR; INTRAVENOUS; SUBCUTANEOUS at 05:08

## 2022-09-02 RX ADMIN — Medication 0.2 MILLIGRAM(S): at 03:03

## 2022-09-02 RX ADMIN — Medication 0.2 MILLIGRAM(S): at 10:56

## 2022-09-02 RX ADMIN — Medication 0.2 MILLIGRAM(S): at 14:53

## 2022-09-02 RX ADMIN — Medication 0.2 MILLIGRAM(S): at 07:04

## 2022-09-02 NOTE — PROGRESS NOTE ADULT - ASSESSMENT
A/P: 30yo PPD#1 s/p . Post-partum course c/b manual extraction of clots. Patient is stable and doing well post-partum.     #Uterine atony  - s/p manual extraction on clots  - Continue Methergine PO     #Routine Postpartum Care  - Continue with PO analgesia  - Increase ambulation  - Continue regular diet  - No labs    Yamile Lorenz, PGY1 A/P: 32yo PPD#2 s/p . Post-partum course c/b manual extraction of clots. Patient is stable and doing well post-partum.     #Uterine atony  - s/p manual extraction on clots  - Continue Methergine PO     #Routine Postpartum Care  - Continue with PO analgesia  - Increase ambulation  - Continue regular diet  - No labs    Yamile Lorenz, PGY1

## 2022-09-02 NOTE — PROGRESS NOTE ADULT - ASSESSMENT
Assessment and Plan  PPD #2 s/p   Doing well and bonding with baby.  good support at bedside  Encourage ambulation.  PP & PPD Instructions reviewed.  CPC.  GDMa1- for GTT 4-12 weeks postpartum- Rx to be given in office  acute blood loss  ·	continue methergine series- last dose 3pm  ·	VSS  ·	H/H stable  D/C home today.  RTO 6 weeks for routine post partum visit/PRN

## 2022-09-02 NOTE — PROGRESS NOTE ADULT - ATTENDING COMMENTS
Patient evaluated at bedside and found stable. Agree with A/P above. Will discharge patient home today.

## 2022-09-02 NOTE — PROGRESS NOTE ADULT - SUBJECTIVE AND OBJECTIVE BOX
OB Progress Note:  PPD#1    S: Patient feels well. She is tolerating Methergine PO well. Denies any N/V. Pt reports she feels slightly warm. Denies HA/lightheadedness/dizziness. Pain is well controlled. She is tolerating a regular diet and passing flatus. She is voiding spontaneously, and ambulating without difficulty. Denies CP/SOB. Denies calf pain.    O:  Vitals:  Vital Signs Last 24 Hrs  T(C): 36.4 (02 Sep 2022 05:42), Max: 36.8 (01 Sep 2022 18:18)  T(F): 97.5 (02 Sep 2022 05:42), Max: 98.3 (01 Sep 2022 18:18)  HR: 60 (02 Sep 2022 05:42) (60 - 76)  BP: 95/58 (02 Sep 2022 05:42) (90/65 - 106/69)  BP(mean): --  RR: 17 (02 Sep 2022 05:42) (16 - 18)  SpO2: 100% (02 Sep 2022 05:42) (99% - 100%)    Parameters below as of 02 Sep 2022 05:42  Patient On (Oxygen Delivery Method): room air      Physical Exam:  General: NAD  Abdomen: soft, non-tender, non-distended, fundus firm  Vaginal: lochia wnl, no increased bleeding w/ fundal pressure, exam deferred  Extremities: no erythema/calf tenderness      MEDICATIONS  (STANDING):  acetaminophen     Tablet .. 975 milliGRAM(s) Oral <User Schedule>  diphtheria/tetanus/pertussis (acellular) Vaccine (ADAcel) 0.5 milliLiter(s) IntraMuscular once  ibuprofen  Tablet. 600 milliGRAM(s) Oral every 6 hours  methylergonovine 0.2 milliGRAM(s) Oral every 4 hours  prenatal multivitamin 1 Tablet(s) Oral daily  sodium chloride 0.9% lock flush 3 milliLiter(s) IV Push every 8 hours      Labs:  Blood type: A Positive  Rubella IgG: RPR: Negative                          11.3<L>   12.93<H> >-----------< 164    (  @ 22:00 )             35.2                        12.2   12.60<H> >-----------< 177    (  @ 12:40 )             35.2                        11.7   -- >-----------< --    (  @ 05:46 )             34.0<L>                        12.7   10.60<H> >-----------< 159    (  @ 09:09 )             38.6         OB Progress Note:  PPD#2    S: Patient feels well. She is tolerating Methergine PO well. Denies any N/V. Pt reports she feels slightly warm. Denies HA/lightheadedness/dizziness. Pain is well controlled. She is tolerating a regular diet and passing flatus. She is voiding spontaneously, and ambulating without difficulty. Denies CP/SOB. Denies calf pain.    O:  Vitals:  Vital Signs Last 24 Hrs  T(C): 36.4 (02 Sep 2022 05:42), Max: 36.8 (01 Sep 2022 18:18)  T(F): 97.5 (02 Sep 2022 05:42), Max: 98.3 (01 Sep 2022 18:18)  HR: 60 (02 Sep 2022 05:42) (60 - 76)  BP: 95/58 (02 Sep 2022 05:42) (90/65 - 106/69)  BP(mean): --  RR: 17 (02 Sep 2022 05:42) (16 - 18)  SpO2: 100% (02 Sep 2022 05:42) (99% - 100%)    Parameters below as of 02 Sep 2022 05:42  Patient On (Oxygen Delivery Method): room air      Physical Exam:  General: NAD  Abdomen: soft, non-tender, non-distended, fundus firm  Vaginal: lochia wnl, no increased bleeding w/ fundal pressure, exam deferred  Extremities: no erythema/calf tenderness      MEDICATIONS  (STANDING):  acetaminophen     Tablet .. 975 milliGRAM(s) Oral <User Schedule>  diphtheria/tetanus/pertussis (acellular) Vaccine (ADAcel) 0.5 milliLiter(s) IntraMuscular once  ibuprofen  Tablet. 600 milliGRAM(s) Oral every 6 hours  methylergonovine 0.2 milliGRAM(s) Oral every 4 hours  prenatal multivitamin 1 Tablet(s) Oral daily  sodium chloride 0.9% lock flush 3 milliLiter(s) IV Push every 8 hours      Labs:  Blood type: A Positive  Rubella IgG: RPR: Negative                          11.3<L>   12.93<H> >-----------< 164    (  @ 22:00 )             35.2                        12.2   12.60<H> >-----------< 177    (  @ 12:40 )             35.2                        11.7   -- >-----------< --    (  @ 05:46 )             34.0<L>                        12.7   10.60<H> >-----------< 159    (  @ 09:09 )             38.6

## 2022-09-02 NOTE — PROGRESS NOTE ADULT - SUBJECTIVE AND OBJECTIVE BOX
Post-partum Note,   She is a  31y woman who is now post-partum day: 2    Subjective:  The patient feels well.  She is ambulating.   She is tolerating regular diet.  She denies nausea and vomiting; denies fever.  She is voiding.  Her pain is controlled.  She reports normal postpartum bleeding.  She is breastfeeding.      Physical exam:    Vital Signs Last 24 Hrs  T(C): 36.4 (02 Sep 2022 05:42), Max: 36.8 (01 Sep 2022 18:18)  T(F): 97.5 (02 Sep 2022 05:42), Max: 98.3 (01 Sep 2022 18:18)  HR: 60 (02 Sep 2022 05:42) (60 - 76)  BP: 95/58 (02 Sep 2022 05:42) (94/60 - 106/69)  BP(mean): --  RR: 17 (02 Sep 2022 05:42) (16 - 18)  SpO2: 100% (02 Sep 2022 05:42) (99% - 100%)    Parameters below as of 02 Sep 2022 05:42  Patient On (Oxygen Delivery Method): room air        Gen: NAD  Breast: Soft, nontender, not engorged.  Abdomen: Soft, nontender, no distension , firm uterine fundus at umbilicus.  Pelvic: Normal lochia noted  Ext: No calf tenderness    LABS:                        11.3   12.93 )-----------( 164      ( 01 Sep 2022 22:00 )             35.2         Allergies    No Known Allergies        MEDICATIONS  (STANDING):  acetaminophen     Tablet .. 975 milliGRAM(s) Oral <User Schedule>  diphtheria/tetanus/pertussis (acellular) Vaccine (ADAcel) 0.5 milliLiter(s) IntraMuscular once  ibuprofen  Tablet. 600 milliGRAM(s) Oral every 6 hours  methylergonovine 0.2 milliGRAM(s) Oral every 4 hours  prenatal multivitamin 1 Tablet(s) Oral daily  sodium chloride 0.9% lock flush 3 milliLiter(s) IV Push every 8 hours    MEDICATIONS  (PRN):  benzocaine 20%/menthol 0.5% Spray 1 Spray(s) Topical every 6 hours PRN for Perineal discomfort  dibucaine 1% Ointment 1 Application(s) Topical every 6 hours PRN Perineal discomfort  diphenhydrAMINE 25 milliGRAM(s) Oral every 6 hours PRN Pruritus  hydrocortisone 1% Cream 1 Application(s) Topical every 6 hours PRN Moderate Pain (4-6)  lanolin Ointment 1 Application(s) Topical every 6 hours PRN nipple soreness  magnesium hydroxide Suspension 30 milliLiter(s) Oral two times a day PRN Constipation  oxyCODONE    IR 5 milliGRAM(s) Oral every 3 hours PRN Moderate to Severe Pain (4-10)  oxyCODONE    IR 5 milliGRAM(s) Oral once PRN Moderate to Severe Pain (4-10)  pramoxine 1%/zinc 5% Cream 1 Application(s) Topical every 4 hours PRN Moderate Pain (4-6)  simethicone 80 milliGRAM(s) Chew every 4 hours PRN Gas  witch hazel Pads 1 Application(s) Topical every 4 hours PRN Perineal discomfort

## 2023-03-09 ENCOUNTER — NON-APPOINTMENT (OUTPATIENT)
Age: 33
End: 2023-03-09

## 2023-03-10 PROBLEM — R10.2 PELVIC PAIN: Status: ACTIVE | Noted: 2023-03-10

## 2023-03-15 ENCOUNTER — APPOINTMENT (OUTPATIENT)
Dept: GYNECOLOGIC ONCOLOGY | Facility: CLINIC | Age: 33
End: 2023-03-15
Payer: COMMERCIAL

## 2023-03-15 VITALS
HEART RATE: 72 BPM | WEIGHT: 106 LBS | BODY MASS INDEX: 19.51 KG/M2 | DIASTOLIC BLOOD PRESSURE: 65 MMHG | HEIGHT: 62 IN | SYSTOLIC BLOOD PRESSURE: 109 MMHG

## 2023-03-15 DIAGNOSIS — R10.2 PELVIC AND PERINEAL PAIN: ICD-10-CM

## 2023-03-15 PROCEDURE — 99204 OFFICE O/P NEW MOD 45 MIN: CPT

## 2023-03-15 RX ORDER — URINE ACETONE TEST STRIPS
STRIP MISCELLANEOUS
Qty: 1 | Refills: 0 | Status: DISCONTINUED | COMMUNITY
Start: 2022-06-15 | End: 2023-03-15

## 2023-03-16 DIAGNOSIS — Z01.818 ENCOUNTER FOR OTHER PREPROCEDURAL EXAMINATION: ICD-10-CM

## 2023-03-17 ENCOUNTER — OUTPATIENT (OUTPATIENT)
Dept: OUTPATIENT SERVICES | Facility: HOSPITAL | Age: 33
LOS: 1 days | End: 2023-03-17

## 2023-03-17 VITALS
RESPIRATION RATE: 16 BRPM | SYSTOLIC BLOOD PRESSURE: 118 MMHG | DIASTOLIC BLOOD PRESSURE: 76 MMHG | HEIGHT: 62 IN | HEART RATE: 72 BPM | OXYGEN SATURATION: 98 % | TEMPERATURE: 97 F | WEIGHT: 106.04 LBS

## 2023-03-17 DIAGNOSIS — R19.00 INTRA-ABDOMINAL AND PELVIC SWELLING, MASS AND LUMP, UNSPECIFIED SITE: ICD-10-CM

## 2023-03-17 LAB
ANION GAP SERPL CALC-SCNC: 13 MMOL/L — SIGNIFICANT CHANGE UP (ref 7–14)
BLD GP AB SCN SERPL QL: NEGATIVE — SIGNIFICANT CHANGE UP
BUN SERPL-MCNC: 17 MG/DL — SIGNIFICANT CHANGE UP (ref 7–23)
CALCIUM SERPL-MCNC: 8.9 MG/DL — SIGNIFICANT CHANGE UP (ref 8.4–10.5)
CHLORIDE SERPL-SCNC: 103 MMOL/L — SIGNIFICANT CHANGE UP (ref 98–107)
CO2 SERPL-SCNC: 24 MMOL/L — SIGNIFICANT CHANGE UP (ref 22–31)
CREAT SERPL-MCNC: 0.5 MG/DL — SIGNIFICANT CHANGE UP (ref 0.5–1.3)
EGFR: 129 ML/MIN/1.73M2 — SIGNIFICANT CHANGE UP
GLUCOSE SERPL-MCNC: 94 MG/DL — SIGNIFICANT CHANGE UP (ref 70–99)
HCG SERPL-ACNC: <5 MIU/ML — SIGNIFICANT CHANGE UP
HCT VFR BLD CALC: 36.7 % — SIGNIFICANT CHANGE UP (ref 34.5–45)
HGB BLD-MCNC: 11.8 G/DL — SIGNIFICANT CHANGE UP (ref 11.5–15.5)
MCHC RBC-ENTMCNC: 31.1 PG — SIGNIFICANT CHANGE UP (ref 27–34)
MCHC RBC-ENTMCNC: 32.2 GM/DL — SIGNIFICANT CHANGE UP (ref 32–36)
MCV RBC AUTO: 96.8 FL — SIGNIFICANT CHANGE UP (ref 80–100)
NRBC # BLD: 0 /100 WBCS — SIGNIFICANT CHANGE UP (ref 0–0)
NRBC # FLD: 0 K/UL — SIGNIFICANT CHANGE UP (ref 0–0)
PLATELET # BLD AUTO: 247 K/UL — SIGNIFICANT CHANGE UP (ref 150–400)
POTASSIUM SERPL-MCNC: 3.7 MMOL/L — SIGNIFICANT CHANGE UP (ref 3.5–5.3)
POTASSIUM SERPL-SCNC: 3.7 MMOL/L — SIGNIFICANT CHANGE UP (ref 3.5–5.3)
RBC # BLD: 3.79 M/UL — LOW (ref 3.8–5.2)
RBC # FLD: 11.8 % — SIGNIFICANT CHANGE UP (ref 10.3–14.5)
RH IG SCN BLD-IMP: POSITIVE — SIGNIFICANT CHANGE UP
SODIUM SERPL-SCNC: 140 MMOL/L — SIGNIFICANT CHANGE UP (ref 135–145)
WBC # BLD: 6.8 K/UL — SIGNIFICANT CHANGE UP (ref 3.8–10.5)
WBC # FLD AUTO: 6.8 K/UL — SIGNIFICANT CHANGE UP (ref 3.8–10.5)

## 2023-03-17 RX ORDER — SODIUM CHLORIDE 9 MG/ML
3 INJECTION INTRAMUSCULAR; INTRAVENOUS; SUBCUTANEOUS EVERY 8 HOURS
Refills: 0 | Status: DISCONTINUED | OUTPATIENT
Start: 2023-03-27 | End: 2023-04-01

## 2023-03-17 NOTE — H&P PST ADULT - HISTORY OF PRESENT ILLNESS
32 yr old female presents with preop dx intra-abdominal and pelvic swelling mass and lump scheduled for ex-lap, resection of pelvic mass possible unilateral salpingectomy oophorectomy possible staging appendectomy, reports annual women exam included a sono revealing pelvic mass, cancer markers negative, reports bloating Denies pelvic pain or bowel changes Patient postpartum gave natural birth in August 2022

## 2023-03-17 NOTE — H&P PST ADULT - PROBLEM SELECTOR PLAN 1
Patient scheduled for surgery on: 3/27/23  Patient provided with verbal and written presurgical instructions  Verbalized understanding  with teach back on the following: Famotidine for GI prophylaxis with small sip of water and  Chlorhexidine wash    Patient reminded to obtain Preop COVID PCR 3-5 days prior to DOS, lab directory provided     Problem: Pre-menopausal   Urine specimen cup provided, UCG on DOS

## 2023-03-17 NOTE — H&P PST ADULT - ENDOCRINE
RX Refill Request    Last Visit: 9/16/2021    Last Refill: 9/8/2022    Scheduled with Blake Fernandez in November    Thanks!   Kerry Cardozo, TENZINN, RN
negative

## 2023-03-17 NOTE — H&P PST ADULT - GASTROINTESTINAL
details… normal active bowel sounds/distended soft/normal active bowel sounds/tender/distended/guarding/mass palpable

## 2023-03-24 LAB — SARS-COV-2 N GENE NPH QL NAA+PROBE: NOT DETECTED

## 2023-03-26 ENCOUNTER — TRANSCRIPTION ENCOUNTER (OUTPATIENT)
Age: 33
End: 2023-03-26

## 2023-03-27 ENCOUNTER — TRANSCRIPTION ENCOUNTER (OUTPATIENT)
Age: 33
End: 2023-03-27

## 2023-03-27 ENCOUNTER — APPOINTMENT (OUTPATIENT)
Dept: GYNECOLOGIC ONCOLOGY | Facility: HOSPITAL | Age: 33
End: 2023-03-27

## 2023-03-27 ENCOUNTER — INPATIENT (INPATIENT)
Facility: HOSPITAL | Age: 33
LOS: 4 days | Discharge: HOME | End: 2023-04-01
Attending: OBSTETRICS & GYNECOLOGY | Admitting: OBSTETRICS & GYNECOLOGY
Payer: COMMERCIAL

## 2023-03-27 ENCOUNTER — RESULT REVIEW (OUTPATIENT)
Age: 33
End: 2023-03-27

## 2023-03-27 VITALS
WEIGHT: 106.04 LBS | TEMPERATURE: 98 F | SYSTOLIC BLOOD PRESSURE: 97 MMHG | OXYGEN SATURATION: 99 % | HEART RATE: 63 BPM | DIASTOLIC BLOOD PRESSURE: 48 MMHG | HEIGHT: 62 IN | RESPIRATION RATE: 16 BRPM

## 2023-03-27 DIAGNOSIS — R19.00 INTRA-ABDOMINAL AND PELVIC SWELLING, MASS AND LUMP, UNSPECIFIED SITE: ICD-10-CM

## 2023-03-27 LAB
ALBUMIN SERPL ELPH-MCNC: 2.6 G/DL — LOW (ref 3.3–5)
ALP SERPL-CCNC: 39 U/L — LOW (ref 40–120)
ALT FLD-CCNC: 9 U/L — SIGNIFICANT CHANGE UP (ref 4–33)
ANION GAP SERPL CALC-SCNC: 12 MMOL/L — SIGNIFICANT CHANGE UP (ref 7–14)
APTT BLD: 19.1 SEC — LOW (ref 27–36.3)
AST SERPL-CCNC: 12 U/L — SIGNIFICANT CHANGE UP (ref 4–32)
BILIRUB SERPL-MCNC: 0.4 MG/DL — SIGNIFICANT CHANGE UP (ref 0.2–1.2)
BUN SERPL-MCNC: 30 MG/DL — HIGH (ref 7–23)
CALCIUM SERPL-MCNC: 7.4 MG/DL — LOW (ref 8.4–10.5)
CHLORIDE SERPL-SCNC: 104 MMOL/L — SIGNIFICANT CHANGE UP (ref 98–107)
CO2 SERPL-SCNC: 19 MMOL/L — LOW (ref 22–31)
CREAT SERPL-MCNC: 0.41 MG/DL — LOW (ref 0.5–1.3)
EGFR: 136 ML/MIN/1.73M2 — SIGNIFICANT CHANGE UP
GAS PNL BLDA: SIGNIFICANT CHANGE UP
GLUCOSE BLDC GLUCOMTR-MCNC: 123 MG/DL — HIGH (ref 70–99)
GLUCOSE SERPL-MCNC: 152 MG/DL — HIGH (ref 70–99)
HCG UR QL: NEGATIVE — SIGNIFICANT CHANGE UP
HCT VFR BLD CALC: 18.9 % — CRITICAL LOW (ref 34.5–45)
HGB BLD-MCNC: 6.3 G/DL — CRITICAL LOW (ref 11.5–15.5)
INR BLD: 1.32 RATIO — HIGH (ref 0.88–1.16)
MAGNESIUM SERPL-MCNC: 1.8 MG/DL — SIGNIFICANT CHANGE UP (ref 1.6–2.6)
MCHC RBC-ENTMCNC: 32 PG — SIGNIFICANT CHANGE UP (ref 27–34)
MCHC RBC-ENTMCNC: 33.3 GM/DL — SIGNIFICANT CHANGE UP (ref 32–36)
MCV RBC AUTO: 95.9 FL — SIGNIFICANT CHANGE UP (ref 80–100)
NRBC # BLD: 0 /100 WBCS — SIGNIFICANT CHANGE UP (ref 0–0)
NRBC # FLD: 0 K/UL — SIGNIFICANT CHANGE UP (ref 0–0)
PHOSPHATE SERPL-MCNC: 3.9 MG/DL — SIGNIFICANT CHANGE UP (ref 2.5–4.5)
PLATELET # BLD AUTO: 198 K/UL — SIGNIFICANT CHANGE UP (ref 150–400)
POTASSIUM SERPL-MCNC: 3.9 MMOL/L — SIGNIFICANT CHANGE UP (ref 3.5–5.3)
POTASSIUM SERPL-SCNC: 3.9 MMOL/L — SIGNIFICANT CHANGE UP (ref 3.5–5.3)
PROT SERPL-MCNC: 4.3 G/DL — LOW (ref 6–8.3)
PROTHROM AB SERPL-ACNC: 15.3 SEC — HIGH (ref 10.5–13.4)
RBC # BLD: 1.97 M/UL — LOW (ref 3.8–5.2)
RBC # FLD: 12.1 % — SIGNIFICANT CHANGE UP (ref 10.3–14.5)
SODIUM SERPL-SCNC: 135 MMOL/L — SIGNIFICANT CHANGE UP (ref 135–145)
TROPONIN T, HIGH SENSITIVITY RESULT: <6 NG/L — SIGNIFICANT CHANGE UP
WBC # BLD: 13.19 K/UL — HIGH (ref 3.8–10.5)
WBC # FLD AUTO: 13.19 K/UL — HIGH (ref 3.8–10.5)

## 2023-03-27 PROCEDURE — 58925 REMOVAL OF OVARIAN CYST(S): CPT

## 2023-03-27 PROCEDURE — 88305 TISSUE EXAM BY PATHOLOGIST: CPT | Mod: 26,59

## 2023-03-27 PROCEDURE — 88305 TISSUE EXAM BY PATHOLOGIST: CPT | Mod: 26

## 2023-03-27 PROCEDURE — 88112 CYTOPATH CELL ENHANCE TECH: CPT | Mod: 26

## 2023-03-27 PROCEDURE — 93010 ELECTROCARDIOGRAM REPORT: CPT

## 2023-03-27 DEVICE — SURGICEL 2 X 14": Type: IMPLANTABLE DEVICE | Site: BILATERAL | Status: FUNCTIONAL

## 2023-03-27 DEVICE — LIGATING CLIPS WECK HORIZON MEDIUM (BLUE) 24: Type: IMPLANTABLE DEVICE | Site: BILATERAL | Status: FUNCTIONAL

## 2023-03-27 DEVICE — LIGATING CLIPS WECK HORIZON LARGE (ORANGE) 24: Type: IMPLANTABLE DEVICE | Site: BILATERAL | Status: FUNCTIONAL

## 2023-03-27 RX ORDER — SIMETHICONE 80 MG/1
80 TABLET, CHEWABLE ORAL EVERY 8 HOURS
Refills: 0 | Status: DISCONTINUED | OUTPATIENT
Start: 2023-03-27 | End: 2023-03-28

## 2023-03-27 RX ORDER — IBUPROFEN 200 MG
1 TABLET ORAL
Qty: 0 | Refills: 0 | DISCHARGE

## 2023-03-27 RX ORDER — SODIUM CHLORIDE 9 MG/ML
1000 INJECTION, SOLUTION INTRAVENOUS
Refills: 0 | Status: DISCONTINUED | OUTPATIENT
Start: 2023-03-27 | End: 2023-03-29

## 2023-03-27 RX ORDER — ONDANSETRON 8 MG/1
8 TABLET, FILM COATED ORAL EVERY 8 HOURS
Refills: 0 | Status: DISCONTINUED | OUTPATIENT
Start: 2023-03-27 | End: 2023-03-27

## 2023-03-27 RX ORDER — CALCIUM GLUCONATE 100 MG/ML
1 VIAL (ML) INTRAVENOUS ONCE
Refills: 0 | Status: COMPLETED | OUTPATIENT
Start: 2023-03-27 | End: 2023-03-28

## 2023-03-27 RX ORDER — ACETAMINOPHEN 500 MG
700 TABLET ORAL EVERY 6 HOURS
Refills: 0 | Status: COMPLETED | OUTPATIENT
Start: 2023-03-27 | End: 2023-03-28

## 2023-03-27 RX ORDER — ACETAMINOPHEN 500 MG
1000 TABLET ORAL EVERY 6 HOURS
Refills: 0 | Status: DISCONTINUED | OUTPATIENT
Start: 2023-03-27 | End: 2023-03-27

## 2023-03-27 RX ORDER — SODIUM CHLORIDE 9 MG/ML
1000 INJECTION, SOLUTION INTRAVENOUS
Refills: 0 | Status: DISCONTINUED | OUTPATIENT
Start: 2023-03-27 | End: 2023-03-27

## 2023-03-27 RX ORDER — ACETAMINOPHEN 500 MG
2 TABLET ORAL
Qty: 0 | Refills: 0 | DISCHARGE

## 2023-03-27 RX ORDER — FAMOTIDINE 10 MG/ML
20 INJECTION INTRAVENOUS ONCE
Refills: 0 | Status: COMPLETED | OUTPATIENT
Start: 2023-03-27 | End: 2023-03-28

## 2023-03-27 RX ORDER — HEPARIN SODIUM 5000 [USP'U]/ML
5000 INJECTION INTRAVENOUS; SUBCUTANEOUS EVERY 8 HOURS
Refills: 0 | Status: DISCONTINUED | OUTPATIENT
Start: 2023-03-27 | End: 2023-03-27

## 2023-03-27 RX ORDER — SENNA PLUS 8.6 MG/1
2 TABLET ORAL AT BEDTIME
Refills: 0 | Status: DISCONTINUED | OUTPATIENT
Start: 2023-03-27 | End: 2023-03-28

## 2023-03-27 RX ORDER — SODIUM CHLORIDE 9 MG/ML
1000 INJECTION, SOLUTION INTRAVENOUS ONCE
Refills: 0 | Status: COMPLETED | OUTPATIENT
Start: 2023-03-27 | End: 2023-03-27

## 2023-03-27 RX ORDER — OXYCODONE HYDROCHLORIDE 5 MG/1
5 TABLET ORAL EVERY 6 HOURS
Refills: 0 | Status: DISCONTINUED | OUTPATIENT
Start: 2023-03-27 | End: 2023-03-27

## 2023-03-27 RX ORDER — SENNA PLUS 8.6 MG/1
1 TABLET ORAL AT BEDTIME
Refills: 0 | Status: DISCONTINUED | OUTPATIENT
Start: 2023-03-27 | End: 2023-03-27

## 2023-03-27 RX ORDER — SODIUM CHLORIDE 9 MG/ML
1000 INJECTION, SOLUTION INTRAVENOUS
Refills: 0 | Status: DISCONTINUED | OUTPATIENT
Start: 2023-03-27 | End: 2023-03-28

## 2023-03-27 RX ORDER — HYDROMORPHONE HYDROCHLORIDE 2 MG/ML
0.5 INJECTION INTRAMUSCULAR; INTRAVENOUS; SUBCUTANEOUS
Refills: 0 | Status: DISCONTINUED | OUTPATIENT
Start: 2023-03-27 | End: 2023-03-27

## 2023-03-27 RX ORDER — ONDANSETRON 8 MG/1
4 TABLET, FILM COATED ORAL ONCE
Refills: 0 | Status: DISCONTINUED | OUTPATIENT
Start: 2023-03-27 | End: 2023-03-27

## 2023-03-27 RX ORDER — MAGNESIUM SULFATE 500 MG/ML
2 VIAL (ML) INJECTION ONCE
Refills: 0 | Status: COMPLETED | OUTPATIENT
Start: 2023-03-27 | End: 2023-03-28

## 2023-03-27 RX ORDER — TRAMADOL HYDROCHLORIDE 50 MG/1
1 TABLET ORAL
Qty: 3 | Refills: 0
Start: 2023-03-27

## 2023-03-27 RX ORDER — ONDANSETRON 8 MG/1
4 TABLET, FILM COATED ORAL EVERY 8 HOURS
Refills: 0 | Status: DISCONTINUED | OUTPATIENT
Start: 2023-03-27 | End: 2023-03-31

## 2023-03-27 RX ORDER — KETOROLAC TROMETHAMINE 30 MG/ML
15 SYRINGE (ML) INJECTION EVERY 8 HOURS
Refills: 0 | Status: DISCONTINUED | OUTPATIENT
Start: 2023-03-27 | End: 2023-03-28

## 2023-03-27 RX ADMIN — HYDROMORPHONE HYDROCHLORIDE 0.5 MILLIGRAM(S): 2 INJECTION INTRAMUSCULAR; INTRAVENOUS; SUBCUTANEOUS at 13:25

## 2023-03-27 RX ADMIN — Medication 15 MILLIGRAM(S): at 20:05

## 2023-03-27 RX ADMIN — SODIUM CHLORIDE 1000 MILLILITER(S): 9 INJECTION, SOLUTION INTRAVENOUS at 23:17

## 2023-03-27 RX ADMIN — HYDROMORPHONE HYDROCHLORIDE 0.5 MILLIGRAM(S): 2 INJECTION INTRAMUSCULAR; INTRAVENOUS; SUBCUTANEOUS at 13:50

## 2023-03-27 RX ADMIN — HEPARIN SODIUM 5000 UNIT(S): 5000 INJECTION INTRAVENOUS; SUBCUTANEOUS at 20:05

## 2023-03-27 RX ADMIN — HYDROMORPHONE HYDROCHLORIDE 0.5 MILLIGRAM(S): 2 INJECTION INTRAMUSCULAR; INTRAVENOUS; SUBCUTANEOUS at 13:35

## 2023-03-27 RX ADMIN — SIMETHICONE 80 MILLIGRAM(S): 80 TABLET, CHEWABLE ORAL at 21:41

## 2023-03-27 RX ADMIN — Medication 700 MILLIGRAM(S): at 20:35

## 2023-03-27 RX ADMIN — SODIUM CHLORIDE 125 MILLILITER(S): 9 INJECTION, SOLUTION INTRAVENOUS at 20:05

## 2023-03-27 RX ADMIN — Medication 400 MILLIGRAM(S): at 20:05

## 2023-03-27 RX ADMIN — HYDROMORPHONE HYDROCHLORIDE 0.5 MILLIGRAM(S): 2 INJECTION INTRAMUSCULAR; INTRAVENOUS; SUBCUTANEOUS at 13:10

## 2023-03-27 RX ADMIN — SODIUM CHLORIDE 3 MILLILITER(S): 9 INJECTION INTRAMUSCULAR; INTRAVENOUS; SUBCUTANEOUS at 14:06

## 2023-03-27 RX ADMIN — ONDANSETRON 4 MILLIGRAM(S): 8 TABLET, FILM COATED ORAL at 22:45

## 2023-03-27 RX ADMIN — SODIUM CHLORIDE 3 MILLILITER(S): 9 INJECTION INTRAMUSCULAR; INTRAVENOUS; SUBCUTANEOUS at 21:37

## 2023-03-27 RX ADMIN — Medication 15 MILLIGRAM(S): at 20:06

## 2023-03-27 NOTE — DISCHARGE NOTE PROVIDER - NSDCFUADDAPPT_GEN_ALL_CORE_FT
Please contact the gastroenterology doctors at the telephone number provided to schedule an appointment as soon as possible

## 2023-03-27 NOTE — DISCHARGE NOTE PROVIDER - CARE PROVIDER_API CALL
Alysia Jo)  Gynecologic Oncology; Obstetrics and Gynecology  [address] [place] [address]  Phone: [phone]  Fax: [phone]  Scheduled Appointment: 04/19/2023 10:00 AM

## 2023-03-27 NOTE — CHART NOTE - NSCHARTNOTEFT_GEN_A_CORE
Surgical Rapid Response Note    Called by RN to bedside as patient not feeling well and a SRR was being called.  On arrival in the room patient is complaining of inability to see. BP noted to be 40/18.  Patient states she felt intense nausea and abdominal pain and then lost her vision. After a few minutes patient's vision returned and pain and nausea dissipated.    T(C): 36.1 (03-27-23 @ 21:42), Max: 36.9 (03-27-23 @ 08:18)  HR: 97 (03-27-23 @ 21:42) (63 - 97)  BP: 92/53 (03-27-23 @ 21:42) (92/53 - 102/68)  BP(mean): 71 (03-27-23 @ 15:00) (69 - 79)  ABP: --  ABP(mean): --  RR: 18 (03-27-23 @ 21:42) (14 - 18)  SpO2: 100% (03-27-23 @ 21:42) (95% - 100%)  Wt(kg): --  CVP(mm Hg): --  CI: --  CAPILLARY BLOOD GLUCOSE      POCT Blood Glucose.: 123 mg/dL (27 Mar 2023 22:53)   N/A      03-27 @ 07:01  -  03-27 @ 23:37  --------------------------------------------------------  IN:    Lactated Ringers: 875 mL    Oral Fluid: 50 mL  Total IN: 925 mL    OUT:    Indwelling Catheter - Urethral (mL): 1075 mL  Total OUT: 1075 mL    Total NET: -150 mL      EXAM  Gen: patient very pale and in emotional distress, crying  Cardio: tachycardic  Abdomen: incision c/d/i, soft, NT ND, bedside fast performed negative  Ext: warm    LABS  FS performed during SRR was over 100  ABG (03-27 @ 23:03)     7.36 / 38 / 123<H> / 22 / -3.6<L> / 98.8<H>%     Lactate:       A/P 32yoF POD 0 s/p ex lap, resection of pelvic mass  Patient a SRR for b/l vision loss with no other focal deficits, likely secondary to hypotension secondary to vasovagal event brought on by nausea and intense abdominal pain. Bedside fast exam negative and abdomen soft ND. Labs sent to r/o hypovolemia secondary to blood loss. vision restored with improvement in BP  - f/u labs: abg, cbc, bmp, coags, T&S  - hold off on narcotics for the time being  - 1 liter bolus given during SRR  - EKG  - Notified RNs to obtain large bore IV access  - discussed plan with team and fellow    Layne Drake, PAC Surgical Rapid Response Note    Called by RN to bedside as patient not feeling well and a SRR was being called.  On arrival in the room patient is complaining of inability to see. BP noted to be 40/18.  Patient states she felt intense nausea and abdominal pain and then lost her vision. After a few minutes patient's vision returned and pain and nausea dissipated.    T(C): 36.1 (03-27-23 @ 21:42), Max: 36.9 (03-27-23 @ 08:18)  HR: 97 (03-27-23 @ 21:42) (63 - 97)  BP: 92/53 (03-27-23 @ 21:42) (92/53 - 102/68)  BP(mean): 71 (03-27-23 @ 15:00) (69 - 79)  ABP: --  ABP(mean): --  RR: 18 (03-27-23 @ 21:42) (14 - 18)  SpO2: 100% (03-27-23 @ 21:42) (95% - 100%)  Wt(kg): --  CVP(mm Hg): --  CI: --  CAPILLARY BLOOD GLUCOSE      POCT Blood Glucose.: 123 mg/dL (27 Mar 2023 22:53)   N/A      03-27 @ 07:01  -  03-27 @ 23:37  --------------------------------------------------------  IN:    Lactated Ringers: 875 mL    Oral Fluid: 50 mL  Total IN: 925 mL    OUT:    Indwelling Catheter - Urethral (mL): 1075 mL  Total OUT: 1075 mL    Total NET: -150 mL      EXAM  Gen: patient very pale and in emotional distress, crying  Cardio: tachycardic  Abdomen: incision c/d/i, soft, NT ND, bedside fast performed negative  Ext: warm    LABS  FS performed during SRR was over 100  ABG (03-27 @ 23:03)     7.36 / 38 / 123<H> / 22 / -3.6<L> / 98.8<H>%     Lactate:       A/P 32yoF POD 0 s/p ex lap, resection of pelvic mass  Patient a SRR for b/l vision loss with no other focal deficits, likely secondary to hypotension secondary to vasovagal event brought on by nausea and intense abdominal pain. Bedside fast exam negative and abdomen soft ND. Labs sent to r/o hypovolemia secondary to blood loss. vision restored with improvement in BP  - f/u labs: abg, cbc, bmp, coags, T&S  - hold off on narcotics for the time being  - 1 liter bolus given during SRR  - EKG  - Notified RNs to obtain large bore IV access  - discussed plan with team and fellow      UPDATE: Patient's CBC reveals large drop in Hg/Hct to 6.3/18.9. Plan for 2upRBC transfusion and return to OR for exploration.  Layne Drake, PAC Surgical Rapid Response Note    Called by RN to bedside as patient not feeling well and a SRR was being called.  On arrival in the room patient is complaining of inability to see. BP noted to be 40/18.  Patient states she felt intense nausea and abdominal pain and then lost her vision. After a few minutes patient's vision returned and pain and nausea dissipated.    T(C): 36.1 (03-27-23 @ 21:42), Max: 36.9 (03-27-23 @ 08:18)  HR: 97 (03-27-23 @ 21:42) (63 - 97)  BP: 92/53 (03-27-23 @ 21:42) (92/53 - 102/68)  BP(mean): 71 (03-27-23 @ 15:00) (69 - 79)  ABP: --  ABP(mean): --  RR: 18 (03-27-23 @ 21:42) (14 - 18)  SpO2: 100% (03-27-23 @ 21:42) (95% - 100%)  Wt(kg): --  CVP(mm Hg): --  CI: --  CAPILLARY BLOOD GLUCOSE      POCT Blood Glucose.: 123 mg/dL (27 Mar 2023 22:53)   N/A      03-27 @ 07:01  -  03-27 @ 23:37  --------------------------------------------------------  IN:    Lactated Ringers: 875 mL    Oral Fluid: 50 mL  Total IN: 925 mL    OUT:    Indwelling Catheter - Urethral (mL): 1075 mL  Total OUT: 1075 mL    Total NET: -150 mL      EXAM  Gen: patient very pale and in emotional distress, crying  Cardio: tachycardic  Abdomen: incision c/d/i, soft, NT ND, bedside fast performed negative  Ext: warm    LABS  FS performed during SRR was over 100  ABG (03-27 @ 23:03)     7.36 / 38 / 123<H> / 22 / -3.6<L> / 98.8<H>%     Lactate:       A/P 32yoF POD 0 s/p ex lap, resection of pelvic mass  Patient a SRR for b/l vision loss with no other focal deficits, likely secondary to hypotension secondary to vasovagal event brought on by nausea and intense abdominal pain. Bedside fast exam negative and abdomen soft ND. Labs sent to r/o hypovolemia secondary to blood loss. vision restored with improvement in BP  - f/u labs: abg, cbc, bmp, coags, T&S  - hold off on narcotics for the time being  - 1 liter bolus given during SRR  - EKG  - Notified RNs to obtain large bore IV access  - discussed plan with team and fellow      UPDATE: Patient's CBC reveals large drop in Hg/Hct to 6.3/18.9. Plan for 2upRBC transfusion and return to OR for exploration.  Layne Drake, PAC      Resident addendum:   Pt seen at beside during rapid response with Frankel PGY-4 and Dr. Martinez ([employer] officer) at bedside after concern for NICHOLAS, b/l vision loss in the presence of hypotension (40/18).  at this time.   Pt was laying supine at the time at the incident. Reports a sudden onset of pain / nausea immediately prior to the incident. Had been tolerating a regular diet (chicken) with PO fluid intake. Pt is breast feeding.   Pt pale appearing, with quick return to vision. Receving 1u  Pt reports resolution of abdominal pain. No abdominal pain with light or deep palpation of the abdomen.   Repeat BP with 90/30 w/ HR 92, and again 87/47 w/ HR 85.   STAT labs drawn: CBC, BMP, T&S, coags, ABG. Surgical Rapid Response Note    Called by RN to bedside as patient not feeling well and a SRR was being called.  On arrival in the room patient is complaining of inability to see. BP noted to be 40/18.  Patient states she felt intense nausea and abdominal pain and then lost her vision. After a few minutes patient's vision returned and pain and nausea dissipated.    T(C): 36.1 (03-27-23 @ 21:42), Max: 36.9 (03-27-23 @ 08:18)  HR: 97 (03-27-23 @ 21:42) (63 - 97)  BP: 92/53 (03-27-23 @ 21:42) (92/53 - 102/68)  BP(mean): 71 (03-27-23 @ 15:00) (69 - 79)  ABP: --  ABP(mean): --  RR: 18 (03-27-23 @ 21:42) (14 - 18)  SpO2: 100% (03-27-23 @ 21:42) (95% - 100%)  Wt(kg): --  CVP(mm Hg): --  CI: --  CAPILLARY BLOOD GLUCOSE      POCT Blood Glucose.: 123 mg/dL (27 Mar 2023 22:53)   N/A      03-27 @ 07:01  -  03-27 @ 23:37  --------------------------------------------------------  IN:    Lactated Ringers: 875 mL    Oral Fluid: 50 mL  Total IN: 925 mL    OUT:    Indwelling Catheter - Urethral (mL): 1075 mL  Total OUT: 1075 mL    Total NET: -150 mL      EXAM  Gen: patient very pale and in emotional distress, crying  Cardio: tachycardic  Abdomen: incision c/d/i, soft, NT ND, bedside fast performed negative  Ext: warm    LABS  FS performed during SRR was over 100  ABG (03-27 @ 23:03)     7.36 / 38 / 123<H> / 22 / -3.6<L> / 98.8<H>%     Lactate:       A/P 32yoF POD 0 s/p ex lap, resection of pelvic mass  Patient a SRR for b/l vision loss with no other focal deficits, likely secondary to hypotension secondary to vasovagal event brought on by nausea and intense abdominal pain. Bedside fast exam negative and abdomen soft ND. Labs sent to r/o hypovolemia secondary to blood loss. vision restored with improvement in BP  - f/u labs: abg, cbc, bmp, coags, T&S  - hold off on narcotics for the time being  - 1 liter bolus given during SRR  - EKG  - Notified RNs to obtain large bore IV access  - discussed plan with team and fellow      UPDATE: Patient's CBC reveals large drop in Hg/Hct to 6.3/18.9. Plan for 2upRBC transfusion and return to OR for exploration.  Layne Drake, PAC      Resident addendum:   Pt seen at beside during rapid response with Frankel PGY-4 and Dr. Martinez (Safte[employer] officer) at bedside after RRT called for LOC, b/l vision loss in the presence of hypotension (40/18). .   Pt is POD#0 from ex-lap and L ovarian cystectomy.     Pt was laying supine at the time at the incident. Reports a sudden onset of pain / nausea immediately prior to the incident. Had been tolerating a regular diet (chicken) with PO fluid intake. Pt is breast feeding.   Pt pale appearing, with quick return to vision. Receiving 1L bolus, on a pressure bag.  Pt reports resolution of abdominal pain. No abdominal pain with light or deep palpation of the abdomen. FAST scan preformed at bedside.   Repeat BP with 90/30 w/ HR 92, and again 87/47 w/ HR 85. Pt regained color to her face and was AxO x4. Pt with adequate UOP.   STAT labs drawn: CBC, BMP, T&S, coags, ABG.     EKG preformed -NSR   2nd IV placed.   Dr. Flores and Dr. Jo made aware.     Stat labs demonstrated significant HCT drop, 36.7 to 18.0, inconsistent with patients surgical blood loss. (EBL = 50cc.)    These findings, in the setting of recent post operative state and new onset hypotension, resulted in the decision to return to the OR for an emergency exploratory laparotomy.   Consents signed at bedside with Dr. Alysia Longoria, PGY-2 Surgical Rapid Response Note    Called by RN to bedside as patient not feeling well and a SRR was being called.  On arrival in the room patient is complaining of inability to see. BP noted to be 40/18.  Patient states she felt intense nausea and abdominal pain and then lost her vision. After a few minutes patient's vision returned and pain and nausea dissipated.    T(C): 36.1 (03-27-23 @ 21:42), Max: 36.9 (03-27-23 @ 08:18)  HR: 97 (03-27-23 @ 21:42) (63 - 97)  BP: 92/53 (03-27-23 @ 21:42) (92/53 - 102/68)  BP(mean): 71 (03-27-23 @ 15:00) (69 - 79)  ABP: --  ABP(mean): --  RR: 18 (03-27-23 @ 21:42) (14 - 18)  SpO2: 100% (03-27-23 @ 21:42) (95% - 100%)  Wt(kg): --  CVP(mm Hg): --  CI: --  CAPILLARY BLOOD GLUCOSE      POCT Blood Glucose.: 123 mg/dL (27 Mar 2023 22:53)   N/A      03-27 @ 07:01  -  03-27 @ 23:37  --------------------------------------------------------  IN:    Lactated Ringers: 875 mL    Oral Fluid: 50 mL  Total IN: 925 mL    OUT:    Indwelling Catheter - Urethral (mL): 1075 mL  Total OUT: 1075 mL    Total NET: -150 mL      EXAM  Gen: patient very pale and in emotional distress, crying  Cardio: tachycardic  Abdomen: incision c/d/i, soft, NT ND, bedside fast performed negative  Ext: warm    LABS  FS performed during SRR was over 100  ABG (03-27 @ 23:03)     7.36 / 38 / 123<H> / 22 / -3.6<L> / 98.8<H>%     Lactate:       A/P 32yoF POD 0 s/p ex lap, resection of pelvic mass  Patient a SRR for b/l vision loss with no other focal deficits, likely secondary to hypotension secondary to vasovagal event brought on by nausea and intense abdominal pain. Bedside fast exam negative and abdomen soft ND. Labs sent to r/o hypovolemia secondary to blood loss. vision restored with improvement in BP  - f/u labs: abg, cbc, bmp, coags, T&S  - hold off on narcotics for the time being  - 1 liter bolus given during SRR  - EKG  - Notified RNs to obtain large bore IV access  - discussed plan with team and fellow      UPDATE: Patient's CBC reveals large drop in Hg/Hct to 6.3/18.9. Plan for 2upRBC transfusion and return to OR for exploration.  Layne Drake, PAC      Resident addendum:   Pt seen at beside during rapid response with Frankel PGY-4 and Dr. Martinez (Saf[employer] officer) at bedside after RRT called for LOC, b/l vision loss in the presence of hypotension (40/18). .   Pt is POD#0 from ex-lap and L ovarian cystectomy.     Pt was laying supine at the time at the incident. Reports a sudden onset of pain / nausea immediately prior to the incident. Had been tolerating a regular diet (chicken) with PO fluid intake. Pt is breast feeding.   Pt pale appearing, with quick return to vision. Receiving 1L bolus, on a pressure bag.  Pt reports resolution of abdominal pain. No abdominal pain with light or deep palpation of the abdomen. FAST scan preformed at bedside.   Repeat BP with 90/30 w/ HR 92, and again 87/47 w/ HR 85. Pt regained color to her face and was AxO x4. Pt with adequate UOP.   STAT labs drawn: CBC, BMP, T&S, coags, ABG.     EKG preformed -NSR   2nd IV placed.   Dr. Flores and Dr. Jo made aware.     Stat labs demonstrated significant HCT drop, 36.7 to 18.0, inconsistent with patients surgical blood loss. (EBL = 50cc.)    These findings, in the setting of recent post operative state and new onset hypotension, resulted in the decision to return to the OR for an emergency exploratory laparotomy.   Consents signed at bedside with Dr. Alysia Longoria, PGY-2        Gyn Onc Fellow Addendum  Delayed entry due to patient care  Events of rapid response discussed via phone with Dr. Longoria. Patient reported abdominal pain, nausea, brief loss of vision/hearing shortly after breastpumping. Patient noted to be hypotensive and tachycardic. Abdominal exam benign, bedside FAST scan negative. Adequate clear urine in hauser. Vital signs normalized on examination with resolution of symptoms. STAT labs, ABG, collected. EKG performed and wnl. Patient received 1L IVF bolus. STAT ABG and CBC revealed significant H/H drop concerning for postoperative bleeding. Patient ordered for 2U PRBC transfusion and decision made to proceed urgently to OR for exploratory laparotomy, evacuation of hemoperitoneum, and possible salpingo-oophorectomy.  d/w Dr. Jo

## 2023-03-27 NOTE — DISCHARGE NOTE PROVIDER - NSDCCPTREATMENT_GEN_ALL_CORE_FT
PRINCIPAL PROCEDURE  Procedure: Exploratory laparotomy with surgical removal of ovarian cyst  Findings and Treatment:

## 2023-03-27 NOTE — PATIENT PROFILE ADULT - FUNCTIONAL ASSESSMENT - DAILY ACTIVITY 1.
[facility] [address] [place] [address]  Phone [phone]  Fax [phone]        8/1/2017      RE:  Chun Carbone      To whom it may concern:    The aforementioned patient may return to work without any restrictions as of 8/14/17.    Sincerely,         Nikkie Zuniga MD  [facility]   4 = No assist / stand by assistance

## 2023-03-27 NOTE — DISCHARGE NOTE PROVIDER - NSDCCPCAREPLAN_GEN_ALL_CORE_FT
PRINCIPAL DISCHARGE DIAGNOSIS  Diagnosis: Intra-abdominal and pelvic swelling of mass or lump  Assessment and Plan of Treatment:

## 2023-03-27 NOTE — PATIENT PROFILE ADULT - FALL HARM RISK - UNIVERSAL INTERVENTIONS
Bed in lowest position, wheels locked, appropriate side rails in place/Call bell, personal items and telephone in reach/Instruct patient to call for assistance before getting out of bed or chair/Non-slip footwear when patient is out of bed/[place] to call system/Physically safe environment - no spills, clutter or unnecessary equipment/Purposeful Proactive Rounding/Room/bathroom lighting operational, light cord in reach

## 2023-03-27 NOTE — DISCHARGE NOTE PROVIDER - HOSPITAL COURSE
Patient presented for scheduled surgery. Patient underwent an exploratory laparotomy, left ovarian cystectomy. Procedure was uncomplicated and patient was taken to the recovery room in stable condition. On POD#0 patient tolerated clears. Pain was controlled with IV Tylenol, Toradol, and Oxycodone. Patient was taken to the floor in stable condition with hauser catheter in situ.     On POD#1 hauser catheter was discontinued and patient voided spontaneously. Patient tolerated a regular diet and pain was controlled with motrin, tylenol, and oxycodone. Patient received routine post operative care and continued meeting post operative milestones.    On day of discharge patient's vital signs were stable, patient was tolerating PO, and ambulating without difficulty. Patient was discharged home with close GYN Onc follow up. Patient to see Dr Jo on 4/19/23 at 10:00a. Patient presented for scheduled surgery. Patient underwent an exploratory laparotomy, left ovarian cystectomy. Procedure was uncomplicated and patient was taken to the recovery room in stable condition. On POD#0 patient tolerated clears. Pain was controlled with IV Tylenol, Toradol, and Oxycodone. Patient was taken to the floor in stable condition with hauser catheter in situ. Overnight on POD#0, patient complained of diminished vision and became hypotensive to 40/18 and tachycardic to 130s. A rapid response was called. STAT labs were sent which were notable for a drop in H/H from 11.8/3637->6.3/18.9. Patient received 2upRBC and decision was made to return to operating room. Patient underwent an exploratory laparotomy, evacuation of hemoperitoneum, and repair of left ovary. Procedure was uncomplicated and patient was taken to the recovery room in stable condition. Patient was subsequently moved to the floor with stable vital signs and hauser in situ. Patient remained NPO.     On POD#1 hauser catheter was discontinued and patient voided spontaneously. Patient tolerated a regular diet and pain was controlled with motrin, tylenol, and oxycodone. Patient received routine post operative care and continued meeting post operative milestones.    On day of discharge patient's vital signs were stable, patient was tolerating PO, and ambulating without difficulty. Patient was discharged home with close GYN Onc follow up. Patient to see Dr Jo on 4/19/23 at 10:00a. Patient presented for scheduled surgery. Patient underwent an exploratory laparotomy, left ovarian cystectomy. Procedure was uncomplicated and patient was taken to the recovery room in stable condition. On POD#0 patient tolerated clears. Pain was controlled with IV Tylenol, Toradol, and Oxycodone. Patient was taken to the floor in stable condition with hauser catheter in situ. Overnight on POD#0, patient complained of diminished vision and became hypotensive to 40/18 and tachycardic to 130s. A rapid response was called. STAT labs were sent which were notable for a drop in H/H from 11.8/3637->6.3/18.9. Patient received 2upRBC and decision was made to return to operating room. Patient underwent an exploratory laparotomy, evacuation of hemoperitoneum, and repair of left ovary. Procedure was uncomplicated and patient was taken to the recovery room in stable condition. Patient was subsequently moved to the floor with stable vital signs and hauser in situ. Patient remained NPO.     On POD#0 rapid response team was called due to hypotension to 40/18 and tachycardia. Patient had nausea and vision loss. STAT labs revealed a large drop in H/H to 6.3/18.9. Patient received 1L fluid bolus and 2u pRBC. She was taken to the OR for exlap, evacuation of hemoperitoneum, cauterization of L ovarian bed. Operative findings include 500cc of hemoperitoneum.     On POD#1 10am labs revealed drop in H/H. Patient received 2u pRBC and STAT CTA which showed a blush of contrast in the L adnexa.     On day of discharge patient's vital signs were stable, patient was tolerating PO, and ambulating without difficulty. Patient was discharged home with close GYN Onc follow up. Patient to see Dr Jo on 4/19/23 at 10:00a. Patient presented for scheduled surgery. Patient underwent an exploratory laparotomy, left ovarian cystectomy. Procedure was uncomplicated and patient was taken to the recovery room in stable condition. On POD#0 patient tolerated clears. Pain was controlled with IV Tylenol, Toradol, and Oxycodone. Patient was taken to the floor in stable condition with hauser catheter in situ. Overnight on POD#0, patient complained of diminished vision and became hypotensive to 40/18 and tachycardic to 130s. A rapid response was called. STAT labs were sent which were notable for a drop in H/H from 11.8/3637->6.3/18.9. Patient received 2upRBC and decision was made to return to operating room. Patient underwent an exploratory laparotomy, evacuation of hemoperitoneum, and repair of left ovary. Procedure was uncomplicated and patient was taken to the recovery room in stable condition. Patient was subsequently moved to the floor with stable vital signs and hauser in situ. Patient remained NPO.     On POD#0 rapid response team was called due to hypotension to 40/18 and tachycardia. Patient had nausea and vision loss. STAT labs revealed a large drop in H/H to 6.3/18.9. Patient received 1L fluid bolus and 2u pRBC. She was taken to the OR for exlap, evacuation of hemoperitoneum, cauterization of L ovarian bed. Operative findings include 500cc of hemoperitoneum.     On POD#1 10am labs revealed drop in H/H. Patient received 2u pRBC and STAT CTA which showed a blush of contrast in the L adnexa consistent with normal postoperative changes. Patient's H/H remained stable overnight.    On POD#2 patient received serial CBCs, all of which were stable. GI saw the patient and they had low suspicion for a GI bleed; however, recommended the patient start a bowel regimen and PPI. Patient was advanced to clear liquid diet.    On POD#3 hauser catheter was removed and patient was advanced to a regular diet. Patient was started on prophylactic anticoagulation. She     On day of discharge patient's vital signs were stable, patient was tolerating PO, and ambulating without difficulty. Patient was discharged home with close GYN Onc follow up. Patient to see Dr Jo on 4/19/23 at 10:00a. Patient presented for scheduled surgery. Patient underwent an exploratory laparotomy, left ovarian cystectomy. Procedure was uncomplicated and patient was taken to the recovery room in stable condition. On POD#0 patient tolerated clears. Pain was controlled with IV Tylenol, Toradol, and Oxycodone. Patient was taken to the floor in stable condition with hauser catheter in situ. Overnight on POD#0, patient complained of diminished vision and became hypotensive to 40/18 and tachycardic to 130s. A rapid response was called. STAT labs were sent which were notable for a drop in H/H from 11.8/3637->6.3/18.9. Patient received 2upRBC and decision was made to return to operating room. Patient underwent an exploratory laparotomy, evacuation of hemoperitoneum, and repair of left ovary. Procedure was uncomplicated and patient was taken to the recovery room in stable condition. Patient was subsequently moved to the floor with stable vital signs and hauser in situ. Patient remained NPO.     On POD#0 rapid response team was called due to hypotension to 40/18 and tachycardia. Patient had nausea and vision loss. STAT labs revealed a large drop in H/H to 6.3/18.9. Patient received 1L fluid bolus and 2u pRBC. She was taken to the OR for exlap, evacuation of hemoperitoneum, cauterization of L ovarian bed. Operative findings include 500cc of hemoperitoneum.     On POD#1 10am labs revealed drop in H/H. Patient received 2u pRBC and STAT CTA which showed a blush of contrast in the L adnexa consistent with normal postoperative changes. Patient's H/H remained stable overnight.    On POD#2 patient received serial CBCs, all of which were stable. GI saw the patient and they had low suspicion for a GI bleed; however, recommended the patient start a bowel regimen and PPI. Patient was advanced to clear liquid diet.    On POD#3 hauser catheter was removed. She had melanotic stool. GI was consulted and recommended EGD. Patient was made NPO at midnight for procedure.     On POD#4 patient had an EGD that showed normal upper GI tract. Patient was advanced to regular diet. H/H stable.    On day of discharge patient's vital signs were stable, patient was tolerating PO, and ambulating without difficulty. Patient was discharged home with close GYN Onc follow up. Patient to see Dr Jo on 4/19/23 at 10:00a. Patient presented for scheduled surgery. Patient underwent an exploratory laparotomy, left ovarian cystectomy. Procedure was uncomplicated and patient was taken to the recovery room in stable condition. On POD#0 patient tolerated clears. Pain was controlled with IV Tylenol, Toradol, and Oxycodone. Patient was taken to the floor in stable condition with hauser catheter in situ. Overnight on POD#0, patient complained of diminished vision and became hypotensive to 40/18 and tachycardic to 130s. A rapid response was called. STAT labs were sent which were notable for a drop in H/H from 11.8/3637->6.3/18.9. Patient received 2upRBC and decision was made to return to operating room. Patient underwent an exploratory laparotomy, evacuation of hemoperitoneum, and repair of left ovary. Procedure was uncomplicated and patient was taken to the recovery room in stable condition. Patient was subsequently moved to the floor with stable vital signs and hauser in situ. Patient remained NPO.     On POD#0 rapid response team was called due to hypotension to 40/18 and tachycardia. Patient had nausea and vision loss. STAT labs revealed a large drop in H/H to 6.3/18.9. Patient received 1L fluid bolus and 2u pRBC. She was taken to the OR for exlap, evacuation of hemoperitoneum, cauterization of L ovarian bed. Operative findings include 500cc of hemoperitoneum.     On POD#1 10am labs revealed drop in H/H. Patient received 2u pRBC and STAT CTA which showed a blush of contrast in the L adnexa consistent with normal postoperative changes. Patient's H/H remained stable overnight.    On POD#2 patient received serial CBCs, all of which were stable. GI saw the patient and they had low suspicion for a GI bleed; however, recommended the patient start a bowel regimen and PPI. Patient was advanced to clear liquid diet.    On POD#3 hauser catheter was removed. She had melanotic stool. GI was consulted and recommended EGD. Patient was made NPO at midnight for procedure.     On POD#4 patient had an EGD that showed normal upper GI tract. Patient was advanced to regular diet. H/H stable.    On day of discharge patient's vital signs were stable, patient was tolerating PO, and ambulating without difficulty. Patient was discharged home with close GYN Onc follow up. Patient to see Dr Jo on 4/19/23 at 10:00a.  Pt also recommended to have GI follow-up for workup of melena

## 2023-03-27 NOTE — DISCHARGE NOTE PROVIDER - NSDCMRMEDTOKEN_GEN_ALL_CORE_FT
ibuprofen 600 mg oral tablet: 1 orally every 6 hours as needed for  moderate pain  traMADol 50 mg oral tablet: 1 tab(s) orally every 8 hours MDD: 3 pills  Tylenol 500 mg oral tablet: 2 orally every 6 hours as needed for  moderate pain   famotidine 20 mg oral tablet: 1 tab(s) orally once a day  ibuprofen 600 mg oral tablet: 1 orally every 6 hours as needed for  moderate pain  traMADol 50 mg oral tablet: 1 tab(s) orally every 8 hours MDD: 3 pills  Tylenol 500 mg oral tablet: 2 orally every 6 hours as needed for  moderate pain

## 2023-03-27 NOTE — DISCHARGE NOTE PROVIDER - NSFOLLOWUPCLINICS_GEN_ALL_ED_FT
[facility]  Gastroenterology  [address] [place] [address] [address]  [place] [address]  Phone: [phone]  Fax:

## 2023-03-27 NOTE — CHART NOTE - NSCHARTNOTEFT_GEN_A_CORE
R2 OBGYN POST-OP CHECK    S: Patient seen and evaluated at bedside. Pt sleeping, easily arousable  Patient reports pain controlled with analgesia. Pt denies N/V, SOB, CP, palpitations, fever/chills. Tolerating clears.  Not OOB yet. Hauser in situ    O:   T(C): 36.7 (03-27-23 @ 14:00), Max: 36.7 (03-27-23 @ 12:40)  HR: 88 (03-27-23 @ 14:30) (64 - 88)  BP: 93/58 (03-27-23 @ 14:30) (93/58 - 102/68)  RR: 14 (03-27-23 @ 14:30) (14 - 18)  SpO2: 96% (03-27-23 @ 14:30) (96% - 99%)  Wt(kg): --  I&O's Summary    27 Mar 2023 07:01  -  27 Mar 2023 14:50  --------------------------------------------------------  IN: 375 mL / OUT: 75 mL / NET: 300 mL        Gen: Resting comfortably in bed, NAD  CV: S1S2, RRR  Lungs: CTA B/L  Abd: soft, appropriately tender, occasional BS x 4 quadrants.    Inc: midline vertical clean/dry/intact w/ dermabond prineo  Ext: SCD's in place and functional, non-tender b/l, no edema        A/P: 32y Female s/p exploratory laparotomy, left ovarian cystectomy. Patient doing well post operatively, beginning to reach all post operative milestones    Neuro: IV Tylenol, Toradol, and Oxycodone for Pain control  CV: Hemodynamically stable.  Monitor VS. CBC w diff in AM.   Pulm: Saturating well on room air.  Encourage OOB and incentive spirometer use.   GI: Tolerating clears, Advance to regular diet. Anti-emetics PRN.  : Hauser to gravity. Consider removing hauser in AM   FEN: Electrolytes: LR@125cc/hr. BMP in AM.   Heme: DVT ppx w/ SCD's while in bed. Early ambulation, initially with assistance then as tolerated.  Continue HSQ   ID: Afebrile  Dispo: Discharge from PACU when criteria met.     NETTIE Bledsoe, PGY2

## 2023-03-28 DIAGNOSIS — Z98.890 OTHER SPECIFIED POSTPROCEDURAL STATES: ICD-10-CM

## 2023-03-28 LAB
ANION GAP SERPL CALC-SCNC: 12 MMOL/L — SIGNIFICANT CHANGE UP (ref 7–14)
APTT BLD: 28.5 SEC — SIGNIFICANT CHANGE UP (ref 27–36.3)
BASOPHILS # BLD AUTO: 0.01 K/UL — SIGNIFICANT CHANGE UP (ref 0–0.2)
BASOPHILS # BLD AUTO: 0.01 K/UL — SIGNIFICANT CHANGE UP (ref 0–0.2)
BASOPHILS # BLD AUTO: 0.02 K/UL — SIGNIFICANT CHANGE UP (ref 0–0.2)
BASOPHILS NFR BLD AUTO: 0.1 % — SIGNIFICANT CHANGE UP (ref 0–2)
BASOPHILS NFR BLD AUTO: 0.1 % — SIGNIFICANT CHANGE UP (ref 0–2)
BASOPHILS NFR BLD AUTO: 0.2 % — SIGNIFICANT CHANGE UP (ref 0–2)
BILIRUB DIRECT SERPL-MCNC: <0.2 MG/DL — SIGNIFICANT CHANGE UP (ref 0–0.3)
BILIRUB INDIRECT FLD-MCNC: >0.3 MG/DL — SIGNIFICANT CHANGE UP (ref 0–1)
BILIRUB SERPL-MCNC: 0.5 MG/DL — SIGNIFICANT CHANGE UP (ref 0.2–1.2)
BLD GP AB SCN SERPL QL: NEGATIVE — SIGNIFICANT CHANGE UP
BLOOD GAS ARTERIAL - LYTES,HGB,ICA,LACT RESULT: SIGNIFICANT CHANGE UP
BUN SERPL-MCNC: 32 MG/DL — HIGH (ref 7–23)
CALCIUM SERPL-MCNC: 7.6 MG/DL — LOW (ref 8.4–10.5)
CHLORIDE SERPL-SCNC: 106 MMOL/L — SIGNIFICANT CHANGE UP (ref 98–107)
CO2 SERPL-SCNC: 21 MMOL/L — LOW (ref 22–31)
CREAT SERPL-MCNC: 0.44 MG/DL — LOW (ref 0.5–1.3)
EGFR: 133 ML/MIN/1.73M2 — SIGNIFICANT CHANGE UP
EOSINOPHIL # BLD AUTO: 0 K/UL — SIGNIFICANT CHANGE UP (ref 0–0.5)
EOSINOPHIL NFR BLD AUTO: 0 % — SIGNIFICANT CHANGE UP (ref 0–6)
GLUCOSE SERPL-MCNC: 115 MG/DL — HIGH (ref 70–99)
HAPTOGLOB SERPL-MCNC: 44 MG/DL — SIGNIFICANT CHANGE UP (ref 34–200)
HCT VFR BLD CALC: 18.6 % — CRITICAL LOW (ref 34.5–45)
HCT VFR BLD CALC: 22 % — LOW (ref 34.5–45)
HCT VFR BLD CALC: 23.9 % — LOW (ref 34.5–45)
HCT VFR BLD CALC: 26.9 % — LOW (ref 34.5–45)
HGB BLD-MCNC: 6.3 G/DL — CRITICAL LOW (ref 11.5–15.5)
HGB BLD-MCNC: 7.3 G/DL — LOW (ref 11.5–15.5)
HGB BLD-MCNC: 8.1 G/DL — LOW (ref 11.5–15.5)
HGB BLD-MCNC: 9.2 G/DL — LOW (ref 11.5–15.5)
IANC: 5.53 K/UL — SIGNIFICANT CHANGE UP (ref 1.8–7.4)
IANC: 6.67 K/UL — SIGNIFICANT CHANGE UP (ref 1.8–7.4)
IANC: 8.45 K/UL — HIGH (ref 1.8–7.4)
IMM GRANULOCYTES NFR BLD AUTO: 0.3 % — SIGNIFICANT CHANGE UP (ref 0–0.9)
IMM GRANULOCYTES NFR BLD AUTO: 0.4 % — SIGNIFICANT CHANGE UP (ref 0–0.9)
IMM GRANULOCYTES NFR BLD AUTO: 0.4 % — SIGNIFICANT CHANGE UP (ref 0–0.9)
INR BLD: 1.26 RATIO — HIGH (ref 0.88–1.16)
LDH SERPL L TO P-CCNC: 184 U/L — SIGNIFICANT CHANGE UP (ref 135–225)
LYMPHOCYTES # BLD AUTO: 1.29 K/UL — SIGNIFICANT CHANGE UP (ref 1–3.3)
LYMPHOCYTES # BLD AUTO: 1.83 K/UL — SIGNIFICANT CHANGE UP (ref 1–3.3)
LYMPHOCYTES # BLD AUTO: 12.2 % — LOW (ref 13–44)
LYMPHOCYTES # BLD AUTO: 19.7 % — SIGNIFICANT CHANGE UP (ref 13–44)
LYMPHOCYTES # BLD AUTO: 2.79 K/UL — SIGNIFICANT CHANGE UP (ref 1–3.3)
LYMPHOCYTES # BLD AUTO: 30.6 % — SIGNIFICANT CHANGE UP (ref 13–44)
MAGNESIUM SERPL-MCNC: 1.8 MG/DL — SIGNIFICANT CHANGE UP (ref 1.6–2.6)
MCHC RBC-ENTMCNC: 30.5 PG — SIGNIFICANT CHANGE UP (ref 27–34)
MCHC RBC-ENTMCNC: 30.9 PG — SIGNIFICANT CHANGE UP (ref 27–34)
MCHC RBC-ENTMCNC: 31.6 PG — SIGNIFICANT CHANGE UP (ref 27–34)
MCHC RBC-ENTMCNC: 31.7 PG — SIGNIFICANT CHANGE UP (ref 27–34)
MCHC RBC-ENTMCNC: 33.2 GM/DL — SIGNIFICANT CHANGE UP (ref 32–36)
MCHC RBC-ENTMCNC: 33.9 GM/DL — SIGNIFICANT CHANGE UP (ref 32–36)
MCHC RBC-ENTMCNC: 33.9 GM/DL — SIGNIFICANT CHANGE UP (ref 32–36)
MCHC RBC-ENTMCNC: 34.2 GM/DL — SIGNIFICANT CHANGE UP (ref 32–36)
MCV RBC AUTO: 89.1 FL — SIGNIFICANT CHANGE UP (ref 80–100)
MCV RBC AUTO: 93.2 FL — SIGNIFICANT CHANGE UP (ref 80–100)
MCV RBC AUTO: 93.4 FL — SIGNIFICANT CHANGE UP (ref 80–100)
MCV RBC AUTO: 93.5 FL — SIGNIFICANT CHANGE UP (ref 80–100)
MONOCYTES # BLD AUTO: 0.74 K/UL — SIGNIFICANT CHANGE UP (ref 0–0.9)
MONOCYTES # BLD AUTO: 0.75 K/UL — SIGNIFICANT CHANGE UP (ref 0–0.9)
MONOCYTES # BLD AUTO: 0.77 K/UL — SIGNIFICANT CHANGE UP (ref 0–0.9)
MONOCYTES NFR BLD AUTO: 7.3 % — SIGNIFICANT CHANGE UP (ref 2–14)
MONOCYTES NFR BLD AUTO: 8 % — SIGNIFICANT CHANGE UP (ref 2–14)
MONOCYTES NFR BLD AUTO: 8.2 % — SIGNIFICANT CHANGE UP (ref 2–14)
NEUTROPHILS # BLD AUTO: 5.53 K/UL — SIGNIFICANT CHANGE UP (ref 1.8–7.4)
NEUTROPHILS # BLD AUTO: 6.67 K/UL — SIGNIFICANT CHANGE UP (ref 1.8–7.4)
NEUTROPHILS # BLD AUTO: 8.45 K/UL — HIGH (ref 1.8–7.4)
NEUTROPHILS NFR BLD AUTO: 60.7 % — SIGNIFICANT CHANGE UP (ref 43–77)
NEUTROPHILS NFR BLD AUTO: 71.8 % — SIGNIFICANT CHANGE UP (ref 43–77)
NEUTROPHILS NFR BLD AUTO: 80 % — HIGH (ref 43–77)
NRBC # BLD: 0 /100 WBCS — SIGNIFICANT CHANGE UP (ref 0–0)
NRBC # FLD: 0 K/UL — SIGNIFICANT CHANGE UP (ref 0–0)
PHOSPHATE SERPL-MCNC: 3.7 MG/DL — SIGNIFICANT CHANGE UP (ref 2.5–4.5)
PLATELET # BLD AUTO: 104 K/UL — LOW (ref 150–400)
PLATELET # BLD AUTO: 119 K/UL — LOW (ref 150–400)
PLATELET # BLD AUTO: 127 K/UL — LOW (ref 150–400)
PLATELET # BLD AUTO: 129 K/UL — LOW (ref 150–400)
POTASSIUM SERPL-MCNC: 4 MMOL/L — SIGNIFICANT CHANGE UP (ref 3.5–5.3)
POTASSIUM SERPL-SCNC: 4 MMOL/L — SIGNIFICANT CHANGE UP (ref 3.5–5.3)
PROTHROM AB SERPL-ACNC: 14.6 SEC — HIGH (ref 10.5–13.4)
RBC # BLD: 1.99 M/UL — LOW (ref 3.8–5.2)
RBC # BLD: 2.36 M/UL — LOW (ref 3.8–5.2)
RBC # BLD: 2.36 M/UL — LOW (ref 3.8–5.2)
RBC # BLD: 2.56 M/UL — LOW (ref 3.8–5.2)
RBC # BLD: 3.02 M/UL — LOW (ref 3.8–5.2)
RBC # FLD: 13.3 % — SIGNIFICANT CHANGE UP (ref 10.3–14.5)
RBC # FLD: 13.5 % — SIGNIFICANT CHANGE UP (ref 10.3–14.5)
RBC # FLD: 13.7 % — SIGNIFICANT CHANGE UP (ref 10.3–14.5)
RBC # FLD: 14 % — SIGNIFICANT CHANGE UP (ref 10.3–14.5)
RETICS #: 53.3 K/UL — SIGNIFICANT CHANGE UP (ref 25–125)
RETICS/RBC NFR: 2.3 % — SIGNIFICANT CHANGE UP (ref 0.5–2.5)
RH IG SCN BLD-IMP: POSITIVE — SIGNIFICANT CHANGE UP
SODIUM SERPL-SCNC: 139 MMOL/L — SIGNIFICANT CHANGE UP (ref 135–145)
WBC # BLD: 10.56 K/UL — HIGH (ref 3.8–10.5)
WBC # BLD: 8.49 K/UL — SIGNIFICANT CHANGE UP (ref 3.8–10.5)
WBC # BLD: 9.12 K/UL — SIGNIFICANT CHANGE UP (ref 3.8–10.5)
WBC # BLD: 9.29 K/UL — SIGNIFICANT CHANGE UP (ref 3.8–10.5)
WBC # FLD AUTO: 10.56 K/UL — HIGH (ref 3.8–10.5)
WBC # FLD AUTO: 8.49 K/UL — SIGNIFICANT CHANGE UP (ref 3.8–10.5)
WBC # FLD AUTO: 9.12 K/UL — SIGNIFICANT CHANGE UP (ref 3.8–10.5)
WBC # FLD AUTO: 9.29 K/UL — SIGNIFICANT CHANGE UP (ref 3.8–10.5)

## 2023-03-28 PROCEDURE — 49002 REOPENING OF ABDOMEN: CPT | Mod: 78

## 2023-03-28 PROCEDURE — 74174 CTA ABD&PLVS W/CONTRAST: CPT | Mod: 26

## 2023-03-28 DEVICE — SEPRAFILM 5 X 6": Type: IMPLANTABLE DEVICE | Status: FUNCTIONAL

## 2023-03-28 RX ORDER — DIPHENHYDRAMINE HCL 50 MG
25 CAPSULE ORAL ONCE
Refills: 0 | Status: COMPLETED | OUTPATIENT
Start: 2023-03-28 | End: 2023-03-28

## 2023-03-28 RX ORDER — PANTOPRAZOLE SODIUM 20 MG/1
40 TABLET, DELAYED RELEASE ORAL
Refills: 0 | Status: DISCONTINUED | OUTPATIENT
Start: 2023-03-28 | End: 2023-03-31

## 2023-03-28 RX ORDER — BENZOCAINE AND MENTHOL 5; 1 G/100ML; G/100ML
1 LIQUID ORAL THREE TIMES A DAY
Refills: 0 | Status: DISCONTINUED | OUTPATIENT
Start: 2023-03-28 | End: 2023-03-31

## 2023-03-28 RX ORDER — OXYCODONE HYDROCHLORIDE 5 MG/1
2.5 TABLET ORAL EVERY 4 HOURS
Refills: 0 | Status: DISCONTINUED | OUTPATIENT
Start: 2023-03-28 | End: 2023-03-28

## 2023-03-28 RX ORDER — ACETAMINOPHEN 500 MG
700 TABLET ORAL EVERY 6 HOURS
Refills: 0 | Status: COMPLETED | OUTPATIENT
Start: 2023-03-28 | End: 2023-03-29

## 2023-03-28 RX ORDER — HYDROMORPHONE HYDROCHLORIDE 2 MG/ML
0.5 INJECTION INTRAMUSCULAR; INTRAVENOUS; SUBCUTANEOUS
Refills: 0 | Status: DISCONTINUED | OUTPATIENT
Start: 2023-03-28 | End: 2023-03-28

## 2023-03-28 RX ORDER — TETRACAINE/BENZOCAINE/BUTAMBEN 2%-14%-2%
1 OINTMENT (GRAM) TOPICAL ONCE
Refills: 0 | Status: DISCONTINUED | OUTPATIENT
Start: 2023-03-28 | End: 2023-03-31

## 2023-03-28 RX ORDER — SODIUM CHLORIDE 9 MG/ML
1000 INJECTION, SOLUTION INTRAVENOUS
Refills: 0 | Status: DISCONTINUED | OUTPATIENT
Start: 2023-03-28 | End: 2023-03-28

## 2023-03-28 RX ORDER — LIDOCAINE 4 G/100G
1 CREAM TOPICAL ONCE
Refills: 0 | Status: DISCONTINUED | OUTPATIENT
Start: 2023-03-28 | End: 2023-03-31

## 2023-03-28 RX ORDER — FAMOTIDINE 10 MG/ML
20 INJECTION INTRAVENOUS DAILY
Refills: 0 | Status: DISCONTINUED | OUTPATIENT
Start: 2023-03-28 | End: 2023-03-30

## 2023-03-28 RX ORDER — HYDROMORPHONE HYDROCHLORIDE 2 MG/ML
0.5 INJECTION INTRAMUSCULAR; INTRAVENOUS; SUBCUTANEOUS EVERY 6 HOURS
Refills: 0 | Status: DISCONTINUED | OUTPATIENT
Start: 2023-03-28 | End: 2023-03-31

## 2023-03-28 RX ORDER — HYDROMORPHONE HYDROCHLORIDE 2 MG/ML
1 INJECTION INTRAMUSCULAR; INTRAVENOUS; SUBCUTANEOUS EVERY 6 HOURS
Refills: 0 | Status: DISCONTINUED | OUTPATIENT
Start: 2023-03-28 | End: 2023-03-31

## 2023-03-28 RX ORDER — OXYCODONE HYDROCHLORIDE 5 MG/1
5 TABLET ORAL EVERY 6 HOURS
Refills: 0 | Status: DISCONTINUED | OUTPATIENT
Start: 2023-03-28 | End: 2023-03-28

## 2023-03-28 RX ORDER — PANTOPRAZOLE SODIUM 20 MG/1
40 TABLET, DELAYED RELEASE ORAL DAILY
Refills: 0 | Status: DISCONTINUED | OUTPATIENT
Start: 2023-03-28 | End: 2023-03-28

## 2023-03-28 RX ADMIN — Medication 280 MILLIGRAM(S): at 10:27

## 2023-03-28 RX ADMIN — Medication 280 MILLIGRAM(S): at 04:00

## 2023-03-28 RX ADMIN — SIMETHICONE 80 MILLIGRAM(S): 80 TABLET, CHEWABLE ORAL at 05:04

## 2023-03-28 RX ADMIN — Medication 25 MILLIGRAM(S): at 12:33

## 2023-03-28 RX ADMIN — HYDROMORPHONE HYDROCHLORIDE 0.5 MILLIGRAM(S): 2 INJECTION INTRAMUSCULAR; INTRAVENOUS; SUBCUTANEOUS at 21:33

## 2023-03-28 RX ADMIN — SIMETHICONE 80 MILLIGRAM(S): 80 TABLET, CHEWABLE ORAL at 13:35

## 2023-03-28 RX ADMIN — Medication 15 MILLIGRAM(S): at 04:56

## 2023-03-28 RX ADMIN — Medication 700 MILLIGRAM(S): at 18:20

## 2023-03-28 RX ADMIN — Medication 700 MILLIGRAM(S): at 09:20

## 2023-03-28 RX ADMIN — SODIUM CHLORIDE 125 MILLILITER(S): 9 INJECTION, SOLUTION INTRAVENOUS at 08:19

## 2023-03-28 RX ADMIN — Medication 15 MILLIGRAM(S): at 05:26

## 2023-03-28 RX ADMIN — SODIUM CHLORIDE 125 MILLILITER(S): 9 INJECTION, SOLUTION INTRAVENOUS at 03:06

## 2023-03-28 RX ADMIN — PANTOPRAZOLE SODIUM 40 MILLIGRAM(S): 20 TABLET, DELAYED RELEASE ORAL at 17:50

## 2023-03-28 RX ADMIN — SODIUM CHLORIDE 3 MILLILITER(S): 9 INJECTION INTRAMUSCULAR; INTRAVENOUS; SUBCUTANEOUS at 13:22

## 2023-03-28 RX ADMIN — FAMOTIDINE 20 MILLIGRAM(S): 10 INJECTION INTRAVENOUS at 08:19

## 2023-03-28 RX ADMIN — HYDROMORPHONE HYDROCHLORIDE 0.5 MILLIGRAM(S): 2 INJECTION INTRAMUSCULAR; INTRAVENOUS; SUBCUTANEOUS at 03:45

## 2023-03-28 RX ADMIN — SODIUM CHLORIDE 75 MILLILITER(S): 9 INJECTION, SOLUTION INTRAVENOUS at 03:44

## 2023-03-28 RX ADMIN — Medication 25 GRAM(S): at 08:20

## 2023-03-28 RX ADMIN — Medication 700 MILLIGRAM(S): at 04:15

## 2023-03-28 RX ADMIN — Medication 100 GRAM(S): at 00:10

## 2023-03-28 RX ADMIN — HYDROMORPHONE HYDROCHLORIDE 0.5 MILLIGRAM(S): 2 INJECTION INTRAMUSCULAR; INTRAVENOUS; SUBCUTANEOUS at 04:00

## 2023-03-28 RX ADMIN — SODIUM CHLORIDE 3 MILLILITER(S): 9 INJECTION INTRAMUSCULAR; INTRAVENOUS; SUBCUTANEOUS at 22:20

## 2023-03-28 RX ADMIN — Medication 280 MILLIGRAM(S): at 17:50

## 2023-03-28 RX ADMIN — BENZOCAINE AND MENTHOL 1 LOZENGE: 5; 1 LIQUID ORAL at 21:34

## 2023-03-28 RX ADMIN — HYDROMORPHONE HYDROCHLORIDE 0.5 MILLIGRAM(S): 2 INJECTION INTRAMUSCULAR; INTRAVENOUS; SUBCUTANEOUS at 21:03

## 2023-03-28 RX ADMIN — SODIUM CHLORIDE 3 MILLILITER(S): 9 INJECTION INTRAMUSCULAR; INTRAVENOUS; SUBCUTANEOUS at 05:04

## 2023-03-28 NOTE — CHART NOTE - NSCHARTNOTEFT_GEN_A_CORE
Note delayed 2/2 clinical duties.     Patient taken to CT scan w/ [facility], for r/o active bleed. Imaging reviewed immediately w/ Radiology Attending Dr. Caraballo. Per Dr. Caraballo, area at base of ovary near hilum w/ blush c/f active bleed. Dr. Lion, Fellow, and Dr. Jo, Attending, reviewed images as well. Patient has now received 1u pRBC. CBC remains appropriate s/p 1u, hemolysis labs unremarkable.     Imaging reviewed with Interventional Radiology. Dr. Jo reviewed with Dr. Carr.  Patient with increased pain and peritoneal signs with movement to CT scanner, however ~20m later appears clinically much improved with little to no pain on abdominal exam (mild to moderate RUQ pain w/ deep palpation). Color increased in face with excellent UOP.    Awaiting discussion with IR and remainder of one more unit pRBC. Will additionally receive 1u FFP and 1u plt.   d/w Dr. Lion, Dr. Cabrera, and Dr. Jo  [place]ney PGY-4

## 2023-03-28 NOTE — CHART NOTE - NSCHARTNOTEFT_GEN_A_CORE
R3 GYN Oncology Event Note    Received notice of critical value of an H/H of 6.3/18.6 and hypotension.     Patient seen and examined at bedside. She reports dizziness with sitting up, has yet to stand up or attempt to ambulate. Blood pressure at that time was noted to be 80s/40s. Currently, she is lying in bed. She feels warm but is otherwise asymptomatic. She denies HA, CP, palpitations, N/V, urinary symptoms.    Vital Signs Last 24 Hrs  T(C): 37 (28 Mar 2023 14:46), Max: 37.3 (27 Mar 2023 22:58)  T(F): 98.6 (28 Mar 2023 14:46), Max: 99.2 (27 Mar 2023 22:58)  HR: 96 (28 Mar 2023 14:46) (70 - 131)  BP: 85/48 (28 Mar 2023 14:46) (47/18 - 113/60)  BP(mean): 65 (28 Mar 2023 04:00) (65 - 77)  RR: 17 (28 Mar 2023 14:46) (16 - 20)  SpO2: 100% (28 Mar 2023 14:46) (97% - 100%)    Parameters below as of 28 Mar 2023 14:46  Patient On (Oxygen Delivery Method): room air    Gen: NAD, lying in bed  CV: RRR  Pulm: CTAB  Abd: soft, appropriately tender, non-distended, no rebound/guarding  LE: non-tender bilaterally    A/P:  - Will transfuse 2uPRBC stat       -- Send repeat labs 30 minutes after 1st unit  - Monitor VS very closely  - NPO, LR@125  - Marr in place, Strict Is/Os    D/W Dr. Lion, GYN Oncology fellow, and Dr. Cabrera, GYN Oncology attending  [facility], PGY3 R3 GYN Oncology Event Note    Received notice of critical value of an H/H of 6.3/18.6 and hypotension.     Patient seen and examined at bedside. She reports dizziness with sitting up, has yet to stand up or attempt to ambulate. Blood pressure at that time was noted to be 80s/40s, HR 100s. Currently, she is lying in bed. She reports some abdominal pain that is tolerable, not similar to what she felt last night. She feels warm but is otherwise asymptomatic. She denies HA, CP, palpitations, N/V, urinary symptoms.    Vital Signs Last 24 Hrs  T(C): 37 (28 Mar 2023 14:46), Max: 37.3 (27 Mar 2023 22:58)  T(F): 98.6 (28 Mar 2023 14:46), Max: 99.2 (27 Mar 2023 22:58)  HR: 96 (28 Mar 2023 14:46) (70 - 131)  BP: 85/48 (28 Mar 2023 14:46) (47/18 - 113/60)  BP(mean): 65 (28 Mar 2023 04:00) (65 - 77)  RR: 17 (28 Mar 2023 14:46) (16 - 20)  SpO2: 100% (28 Mar 2023 14:46) (97% - 100%)    Parameters below as of 28 Mar 2023 14:46  Patient On (Oxygen Delivery Method): room air    Gen: NAD, lying in bed  CV: RRR  Pulm: CTAB  Abd: soft, non-tender, non-distended, no rebound/guarding  LE: non-tender bilaterally    A/P:  - Will transfuse 2uPRBC stat       -- Send repeat labs 30 minutes after 1st unit  - Monitor VS very closely  - NPO, LR@125  - Marr in place, Strict Is/Os    D/W Dr. Lion, GYN Oncology fellow, and Dr. Cabrera, GYN Oncology attending  [facility], PGY3 R3 GYN Oncology Event Note    Received notice of critical value of an H/H of 6.3/18.6 and hypotension.     Patient seen and examined at bedside. She reports dizziness with sitting up, has yet to stand up or attempt to ambulate. Blood pressure at that time was noted to be 80s/40s, HR 100s. Currently, she is lying in bed. She reports some abdominal pain that is tolerable, not similar to what she felt last night. She feels warm but is otherwise asymptomatic. She denies HA, CP, palpitations, N/V, urinary symptoms.    Vital Signs Last 24 Hrs  T(C): 37 (28 Mar 2023 14:46), Max: 37.3 (27 Mar 2023 22:58)  T(F): 98.6 (28 Mar 2023 14:46), Max: 99.2 (27 Mar 2023 22:58)  HR: 96 (28 Mar 2023 14:46) (70 - 131)  BP: 85/48 (28 Mar 2023 14:46) (47/18 - 113/60)  BP(mean): 65 (28 Mar 2023 04:00) (65 - 77)  RR: 17 (28 Mar 2023 14:46) (16 - 20)  SpO2: 100% (28 Mar 2023 14:46) (97% - 100%)    Parameters below as of 28 Mar 2023 14:46  Patient On (Oxygen Delivery Method): room air    Gen: NAD, lying in bed  CV: RRR  Pulm: CTAB  Abd: soft, non-tender, non-distended, no rebound/guarding  LE: non-tender bilaterally    A/P:  - Will transfuse 2uPRBC stat       -- Send repeat labs 30 minutes after 1st unit  - CT Angio A/P  - Monitor VS very closely  - NPO, LR@125  - Marr in place, Strict Is/Os    D/W Dr. Lion, GYN Oncology fellow, and Dr. Cabrera, GYN Oncology attending  [facility], PGY3

## 2023-03-28 NOTE — PROVIDER CONTACT NOTE (CRITICAL VALUE NOTIFICATION) - SITUATION
2300 pt vasovagal complaining of nausea/ abd pain  rapid called.  bolus started.
2300 pt vasovagal complaining of nausea/abd pain. rapid called. bolus started.
For Pt Laura Lamb Rm 419A. Pt repeat CBC H/H value is low. 6.3/ 18.6

## 2023-03-28 NOTE — PROVIDER CONTACT NOTE (CRITICAL VALUE NOTIFICATION) - ACTION/TREATMENT ORDERED:
pt going back to OR. EKG ordered and done. calcium gluconate ordered and started. blood request sent to OR. OR report given.

## 2023-03-28 NOTE — PROVIDER CONTACT NOTE (CRITICAL VALUE NOTIFICATION) - BACKGROUND
s/p ex lap removal of ovarian cyst
Pt is S/P 3/27 ex-lap ovarian cyst   3/28 evacuation of hematoma  s/p 2unit PRBC
s/p ex lap removal of ovarian cyst

## 2023-03-28 NOTE — BRIEF OPERATIVE NOTE - NSICDXBRIEFPREOP_GEN_ALL_CORE_FT
PRE-OP DIAGNOSIS:  Pelvic mass in female 27-Mar-2023 13:34:53  Richar Pond  
PRE-OP DIAGNOSIS:  Postoperative anemia due to acute blood loss 28-Mar-2023 02:47:19  Frankel, Robyn

## 2023-03-28 NOTE — CHART NOTE - NSCHARTNOTEFT_GEN_A_CORE
Pt reevaluated several times since last documentation by author. Upon review of CT angio, read shows c/f bleeding at ovary however after reviewing imaging there appears to be changes c/w expected post op change w/ scant free fluid not corresponding to clinical appearance and drop in H/H. Upon review of imaging pt's stomach appears markedly distended w/ gastric contents as well as heterogeneous liquid c/f blood products w/ layering raising concern for an upper GI bleed. Anesthesia records reviewed and noted OG placement w/ first surgery yesterday. Reviewed pt hx and she does not endorse hx of GERD, reflux, GI bleed in past. Pt abd remains benign w/o peritoneal signs except for interval development of upper abdominal pain. Remains w/o N/V, no melena or hematemesis. She appears less pale on exam. high dose PPI initiated, maintaining NPO/IVF. NGT placement was attempted to aspirate contents and confirm dx however pt was not able to tolerate placement and is declining repeat attempt. Above d/w GI fellow on call who agrees w/ management as of now. Repeat CBC was performed following 2nd unit pRBCs which showed a more than expected increase in H/H w/ thrombocytopenia. Plan for transfusion of plt and ffp given 4u prbcs now transfused. Pt also w/ new onset fever to 101 at time of most recent assessment, likely 2/2 blood product admin will cont to monitor for now. Cont tylenol and dilaudid for pain, avoid NSAIDs. Cont venodynes, hold chemical dvt ppx. Will cont to trend CBC q6h overnight w/ serial exams, low threshold for additional transfusion. If clinical status worsening or develops hematemesis overnight will d/w GI. All findings and plan were d/w the pt, her , and her parents at the bedside and all questions were answered.    Kamilla Lion MD  Pt seen and d/w Dr. Jo

## 2023-03-28 NOTE — PROGRESS NOTE ADULT - NSPROGADDITIONALINFOA_GEN_ALL_CORE
patient seen and evalauted at 3pm  feeling better since this am  blood transfusion started  BPs improved, abd soft/nt/nd, incision dry, no bleeding  will closely monitor, may need additional blood transfusion  CT angio to r/o any other site of bleeding  all questions answered

## 2023-03-28 NOTE — CHART NOTE - NSCHARTNOTEFT_GEN_A_CORE
S: Patient seen and evaluated at bedside. Pt sleeping, easily aroused. Patient reports pain control with analgesia. Pt denies N/V, SOB, CP, palpitations, fever/chills. Tolerating clears.  Not OOB yet, still feeling sleepy from anesthesia. Marr in situ.     O:   T(C): 36.8 (03-28-23 @ 04:00), Max: 36.9 (03-28-23 @ 02:15)  HR: 83 (03-28-23 @ 04:00) (70 - 83)  BP: 99/55 (03-28-23 @ 04:00) (99/55 - 113/60)  RR: 20 (03-28-23 @ 04:00) (17 - 20)  SpO2: 98% (03-28-23 @ 04:00) (97% - 100%)  Wt(kg): --  I&O's Summary    27 Mar 2023 07:01  -  28 Mar 2023 04:41  --------------------------------------------------------  IN: 1125 mL / OUT: 1425 mL / NET: -300 mL        Gen: Resting comfortably in bed, NAD  CV: Regular   Lungs: Non labored breathing. Saturating well on RA   Abd: soft, appropriately tender,   Inc: Midline vertical incision. Clean/dry/intact w/ Perineo dermabound in place. Abdominal binder in place.   Ext: SCD's in place and functional, non-tender b/l, no edema    Labs:             6.3    13.19 )-----------( 198      ( 03-27 @ 23:32 )             18.9         MEDICATIONS  (STANDING):  acetaminophen   IVPB .. 700 milliGRAM(s) IV Intermittent every 6 hours  famotidine Injectable 20 milliGRAM(s) IV Push once  ketorolac   Injectable 15 milliGRAM(s) IV Push every 8 hours  lactated ringers. 1000 milliLiter(s) (75 mL/Hr) IV Continuous <Continuous>  lactated ringers. 1000 milliLiter(s) (125 mL/Hr) IV Continuous <Continuous>  magnesium sulfate  IVPB 2 Gram(s) IV Intermittent once  senna 2 Tablet(s) Oral at bedtime  simethicone 80 milliGRAM(s) Chew every 8 hours  sodium chloride 0.9% lock flush 3 milliLiter(s) IV Push every 8 hours    MEDICATIONS  (PRN):  HYDROmorphone  Injectable 0.5 milliGRAM(s) IV Push every 10 minutes PRN Moderate Pain (4 - 6)  ondansetron Injectable 4 milliGRAM(s) IV Push every 8 hours PRN Nausea and/or Vomiting        A/P: 32y Female s/p ex-lap, evacuation of hemoperitoneum, and cauterization of ovarian bed.     Neuro: PO Analgesia PRN    CV: Hemodynamically stable, blood pressures improved. Not tachycardic.  Monitor VS. CBC in AM.   Pulm: Saturating well on room air.  Encourage OOB and incentive spirometer use.   GI: Advance to regular diet. Anti-emetics PRN.  : Marr to gravity.  FEN: Electrolytes: LR@125cc/hr. BMP in AM.   Heme: DVT ppx w/ SCD's while in bed. Early ambulation, initially with assistance then as tolerated.    ID: Afebrile  Endo: No active issues   Dispo: Discharge from PACU when criteria met.     Viola Longoria, PGY-2

## 2023-03-28 NOTE — PROVIDER CONTACT NOTE (CRITICAL VALUE NOTIFICATION) - ACTION/TREATMENT ORDERED:
Will round with team possible transfusion Team rounded. 2 units PRBC ordered and administration started,

## 2023-03-28 NOTE — PROVIDER CONTACT NOTE (CRITICAL VALUE NOTIFICATION) - ASSESSMENT
pt comfortable now, provider at bedside
Pt is A&Ox4.   Pt most recent vitals- pt was hypotensive and soft tachycardic. Pt denies any dizziness, states she feels tired.
pt comfortable now, denies nausea. provider at bedside.

## 2023-03-28 NOTE — CHART NOTE - NSCHARTNOTEFT_GEN_A_CORE
Pt examined at bedside for serial abdominal exam. Endorses pelvic pain, R>L. States it improves with dilaudid. Denies lightheadedness, dizziness, chest pain, SOB, nausea, vomiting, vaginal bleeding. Has not yet ambulated. Not yet passing flatus. Pt actively receiving FFP during examination.    T(F): 97.9 (03-28-23 @ 21:34), Max: 101 (03-28-23 @ 19:40)  HR: 96 (03-28-23 @ 21:34) (70 - 110)  BP: 99/62 (03-28-23 @ 21:34) (80/48 - 113/60)  RR: 18 (03-28-23 @ 21:34) (16 - 20)  SpO2: 98% (03-28-23 @ 21:34) (97% - 100%)  Wt(kg): --  I&O's Summary    27 Mar 2023 07:01  -  28 Mar 2023 07:00  --------------------------------------------------------  IN: 1375 mL / OUT: 2125 mL / NET: -750 mL    28 Mar 2023 07:01  -  28 Mar 2023 23:37  --------------------------------------------------------  IN: 1950 mL / OUT: 1700 mL / NET: 250 mL      LABS:  03-28    139    |  106    |  32<H>  ----------------------------<  115<H>  4.0     |  21<L>  |  0.44<L>  03-27    135    |  104    |  30<H>  ----------------------------<  152<H>  3.9     |  19<L>  |  0.41<L>    Ca    7.6<L>      28 Mar 2023 05:25  Ca    7.4<L>      27 Mar 2023 23:03  Phos  3.7       03-28  Phos  3.9       03-27  Mg     1.80      03-28  Mg     1.80      03-27    TPro  x      /  Alb  x      /  TBili  0.5    /  DBili  <0.2   /  AST  x      /  ALT  x      /  AlkPhos  x      03-28  TPro  4.3<L>  /  Alb  2.6<L>  /  TBili  0.4    /  DBili  x      /  AST  12     /  ALT  9      /  AlkPhos  39<L>  03-27    PT/INR - ( 28 Mar 2023 18:42 )   PT: 14.6 sec;   INR: 1.26 ratio    PTT - ( 28 Mar 2023 18:42 )  PTT:28.5 sec    Gen: in NAD, resting comfortably in bed  Abd: soft, minimally tender to palpation in lower quadrants near incision, nondistended, no rebound or guarding, midline vertical incision c/d/i with dermabond prineo and abdominal binder in place  : hauser catheter in place draining ample clear yellow urine  Ext: no CASIMIRO or calf tenderness, venodynes in place and working      A/P: 31yo F POD1 s/p ex lap, left ovarian cystectomy (EBL 50cc) c/b sRRT on POD0 for acute blood loss anemia and hypotension with RTOR for ex lap, evacuation of 500cc of hemoperitoneum and cauterization of left ovarian bed. She is s/p 4u pRBC, 1u PLT, and 1u FFP due to repeat labs which showed h/h 6.3/18.6 with elevated PT/INR, with appropriate rise in H/H, most recently 9.2/26.9. CT Angio showed small volume hemoperitoneum, likely reflective of post operative state, and stomach enlargement with heterogenous contents, c/f possible upper GI bleed. Patient is currently hemodynamically stable and clinically well appearing without hematemesis or significant abdominal pain at this time.     Plan:  -follow up repeat labs at 12am  -for repeat serial abdominal exam at 330a  -c/w IV Tylenol, IV Dilaudid PRN for pain  -for GI consult in AM  -keep NPO, LR@125  -maintain hauser for close I/Os  -monitor VS closely    D/w Dr. Lion  Seen with Teri PGY2  RFrankel PGY4

## 2023-03-28 NOTE — PROGRESS NOTE ADULT - ASSESSMENT
33yo POD#1 s/p exploratory laparotomy, left ovarian cystectomy (EBL 50cc) c/b RRT on POD#0 for hypotension and acute blood loss anemia now s/p RTOR with evacuation of 500cc of hemoperitoneum and cauterization of ovarian bed. She received 2uPRBC overnight with appropriate rise in H/H. Patient currently clinically and hemodynamically stable, asymptomatic this morning.     Neuro: pain well controlled on IV Tylenol and Toradol   CV: hemodynamically stable, f/u AM CBC  - H/H: 11.8/36.7 -> 6.3/18.9 -> 2uPRBC -> 8.1/23.9  Pulm: O2 sat wnl on RA, encourage incentive spirometry use  GI: NPO overnight, con't Senna, Simethicone, Zofran prn  : Marr in place, UOP adequate  - Stricts Is/Os  FEN: LR@125; f/u BMP, replete electrolytes prn  Heme: SCDs while in bed for DVT ppx; chemical DVT ppx being held  ID: afebrile, no signs of infection  Dispo: continue routine post-op care    Patient seen and examined by gyn-onc team and attending.    BOBBY Pond MD PGY-3  31yo POD#1 s/p exploratory laparotomy, left ovarian cystectomy (EBL 50cc) c/b RRT on POD#0 for hypotension and acute blood loss anemia now s/p RTOR with evacuation of 500cc of hemoperitoneum and cauterization of ovarian bed. She received 2uPRBC overnight with appropriate rise in H/H. Patient currently clinically and hemodynamically stable, asymptomatic this morning.     Neuro: pain well controlled on IV Tylenol and Toradol   CV: hemodynamically stable, f/u AM CBC  - H/H: 11.8/36.7 -> 6.3/18.9 -> 2uPRBC -> 9.2/28.0  Pulm: O2 sat wnl on RA, encourage incentive spirometry use  GI: NPO overnight, con't Senna, Simethicone, Zofran prn  : Marr in place, UOP adequate  - Stricts Is/Os  FEN: LR@125; f/u BMP, replete electrolytes prn  Heme: SCDs while in bed for DVT ppx; chemical DVT ppx being held  ID: afebrile, no signs of infection  Dispo: continue routine post-op care    Patient seen and examined by gyn-onc team and attending.    BOBBY Pond MD PGY-3

## 2023-03-28 NOTE — BRIEF OPERATIVE NOTE - NSICDXBRIEFPROCEDURE_GEN_ALL_CORE_FT
PROCEDURES:  Evacuation of hemoperitoneum 28-Mar-2023 02:39:38  Frankel, Robyn  Emergency exploratory laparotomy 28-Mar-2023 02:39:52  Frankel, Robyn  Open repair of left ovary 28-Mar-2023 02:44:44  Frankel, Robyn  
PROCEDURES:  Exploratory laparotomy with surgical removal of ovarian cyst 27-Mar-2023 13:34:29  Richar Pond

## 2023-03-28 NOTE — BRIEF OPERATIVE NOTE - NSICDXBRIEFPOSTOP_GEN_ALL_CORE_FT
POST-OP DIAGNOSIS:  Postoperative anemia due to acute blood loss 28-Mar-2023 02:47:25  Frankel, Robyn

## 2023-03-28 NOTE — CHART NOTE - NSCHARTNOTEFT_GEN_A_CORE
Note entry delay 2/2 patient care. Alerted by team pt hypotensive to 80s/40s, HR low 100s w/ repeat CBC 6.3/18.6. Pt evaluated at bedside and reports she feels mildly nauseous and earlier had some dizziness, feels better than when she was taken to operating room last evening. Exam shows benign abd. Based on clinical appearance pt currently stable to cont to proceed w/ work up. Plan for 2u pRBCs w/ post transfusion CBC, hemolysis labs, CT angio to eval for ongoing bleeding.    Kamilla Lion MD  Plan d/w Dr. Cabrera and Dr. Jo

## 2023-03-28 NOTE — PROGRESS NOTE ADULT - SUBJECTIVE AND OBJECTIVE BOX
Gyn ONC Progress Note POD#1/HD#2    Subjective: Patient seen and examined at bedside. Overnight, patient had an episode of hypotension, abdominal pain, and nausea s/p RRT and return to OR with evacuation of hemoperitoneum. This morning, patient has no acute complaints. She reports her pain is well controlled.  Patient has not yet ambulated, Marr is still in place. NPO overnight. Has not yet passed flatus. Denies lightheadedness, dizziness, CP, SOB, N/V, fevers, and chills.    Objective:  T(F): 98.5 (03-28-23 @ 04:55), Max: 99.2 (03-27-23 @ 22:58)  HR: 97 (03-28-23 @ 04:55) (63 - 131)  BP: 103/52 (03-28-23 @ 04:55) (47/18 - 113/60)  RR: 17 (03-28-23 @ 04:55) (14 - 20)  SpO2: 98% (03-28-23 @ 04:55) (95% - 100%)  Wt(kg): --    I&O's Summary  27 Mar 2023 07:01  -  28 Mar 2023 06:32  --------------------------------------------------------  IN: 1250 mL / OUT: 2125 mL / NET: -875 mL      CAPILLARY BLOOD GLUCOSE  POCT Blood Glucose.: 123 mg/dL (27 Mar 2023 22:53)      MEDICATIONS  (STANDING):  acetaminophen   IVPB .. 700 milliGRAM(s) IV Intermittent every 6 hours  famotidine Injectable 20 milliGRAM(s) IV Push once  ketorolac   Injectable 15 milliGRAM(s) IV Push every 8 hours  lactated ringers. 1000 milliLiter(s) (125 mL/Hr) IV Continuous <Continuous>  magnesium sulfate  IVPB 2 Gram(s) IV Intermittent once  senna 2 Tablet(s) Oral at bedtime  simethicone 80 milliGRAM(s) Chew every 8 hours  sodium chloride 0.9% lock flush 3 milliLiter(s) IV Push every 8 hours    MEDICATIONS  (PRN):  ondansetron Injectable 4 milliGRAM(s) IV Push every 8 hours PRN Nausea and/or Vomiting      Physical Exam:  Constitutional: NAD, A+O x3  CV: RRR  Lungs: clear to auscultation bilaterally  Abdomen: soft, nondistended, appropriately tender, no guarding/rebound, normal bowel sounds  Incision: midline vertical clean, dry, intact with dermabond prineo in place  Extremities: no lower extremity edema or calf tenderness bilaterally; venodynes in place    LABS:               8.1    10.56 )-----------( 129      ( 03-28 @ 05:25 )             23.9                6.3    13.19 )-----------( 198      ( 03-27 @ 23:32 )             18.9                11.8   6.80  )-----------( 247      ( 03-17 @ 17:20 )             36.7       03-28  139    |  106    |  32<H>  ----------------------------<  115<H>  4.0     |  21<L>  |  0.44<L>    03-27  135    |  104    |  30<H>  ----------------------------<  152<H>  3.9     |  19<L>  |  0.41<L>    Ca    7.6<L>      28 Mar 2023 05:25  Ca    7.4<L>      27 Mar 2023 23:03  Phos  3.7       03-28  Phos  3.9       03-27  Mg     1.80      03-28  Mg     1.80      03-27    TPro  4.3<L>  /  Alb  2.6<L>  /  TBili  0.4    /  DBili  x      /  AST  12     /  ALT  9      /  AlkPhos  39<L>  03-27    PT/INR - ( 27 Mar 2023 23:03 )   PT: 15.3 sec;   INR: 1.32 ratio       PTT - ( 27 Mar 2023 23:03 )  PTT:19.1 sec         Gyn ONC Progress Note POD#1/HD#2    Subjective: Patient seen and examined at bedside. Overnight, patient had an episode of hypotension, abdominal pain, and nausea s/p RRT and return to OR with evacuation of hemoperitoneum. This morning, patient has no acute complaints. She reports her pain is well controlled. Patient has not yet ambulated, Marr is still in place. NPO overnight. Has not yet passed flatus. Denies lightheadedness, dizziness, CP, SOB, N/V, fevers, and chills.    Objective:  T(F): 98.5 (03-28-23 @ 04:55), Max: 99.2 (03-27-23 @ 22:58)  HR: 97 (03-28-23 @ 04:55) (63 - 131)  BP: 103/52 (03-28-23 @ 04:55) (47/18 - 113/60)  RR: 17 (03-28-23 @ 04:55) (14 - 20)  SpO2: 98% (03-28-23 @ 04:55) (95% - 100%)  Wt(kg): --    I&O's Summary  27 Mar 2023 07:01  -  28 Mar 2023 06:32  --------------------------------------------------------  IN: 1250 mL / OUT: 2125 mL / NET: -875 mL      CAPILLARY BLOOD GLUCOSE  POCT Blood Glucose.: 123 mg/dL (27 Mar 2023 22:53)      MEDICATIONS  (STANDING):  acetaminophen   IVPB .. 700 milliGRAM(s) IV Intermittent every 6 hours  famotidine Injectable 20 milliGRAM(s) IV Push once  ketorolac   Injectable 15 milliGRAM(s) IV Push every 8 hours  lactated ringers. 1000 milliLiter(s) (125 mL/Hr) IV Continuous <Continuous>  magnesium sulfate  IVPB 2 Gram(s) IV Intermittent once  senna 2 Tablet(s) Oral at bedtime  simethicone 80 milliGRAM(s) Chew every 8 hours  sodium chloride 0.9% lock flush 3 milliLiter(s) IV Push every 8 hours    MEDICATIONS  (PRN):  ondansetron Injectable 4 milliGRAM(s) IV Push every 8 hours PRN Nausea and/or Vomiting      Physical Exam:  Constitutional: NAD, A+O x3  CV: RRR  Lungs: clear to auscultation bilaterally  Abdomen: soft, nondistended, appropriately tender, no guarding/rebound, normal bowel sounds  Incision: midline vertical clean, dry, intact with dermabond prineo in place  Extremities: no lower extremity edema or calf tenderness bilaterally; venodynes in place    LABS:               8.1    10.56 )-----------( 129      ( 03-28 @ 05:25 )             23.9                6.3    13.19 )-----------( 198      ( 03-27 @ 23:32 )             18.9                11.8   6.80  )-----------( 247      ( 03-17 @ 17:20 )             36.7       03-28  139    |  106    |  32<H>  ----------------------------<  115<H>  4.0     |  21<L>  |  0.44<L>    03-27  135    |  104    |  30<H>  ----------------------------<  152<H>  3.9     |  19<L>  |  0.41<L>    Ca    7.6<L>      28 Mar 2023 05:25  Ca    7.4<L>      27 Mar 2023 23:03  Phos  3.7       03-28  Phos  3.9       03-27  Mg     1.80      03-28  Mg     1.80      03-27    TPro  4.3<L>  /  Alb  2.6<L>  /  TBili  0.4    /  DBili  x      /  AST  12     /  ALT  9      /  AlkPhos  39<L>  03-27    PT/INR - ( 27 Mar 2023 23:03 )   PT: 15.3 sec;   INR: 1.32 ratio       PTT - ( 27 Mar 2023 23:03 )  PTT:19.1 sec         Gyn ONC Progress Note POD#1/HD#2    Subjective: Patient seen and examined at bedside. Overnight, patient had an episode of hypotension, abdominal pain, and nausea s/p RRT and return to OR with evacuation of hemoperitoneum. This morning, patient has no acute complaints. She reports her pain is well controlled. Patient has not yet ambulated, Marr is still in place. NPO overnight. Has not yet passed flatus. Denies lightheadedness, dizziness, CP, SOB, N/V, fevers, and chills.    Objective:  T(F): 98.5 (03-28-23 @ 04:55), Max: 99.2 (03-27-23 @ 22:58)  HR: 97 (03-28-23 @ 04:55) (63 - 131)  BP: 103/52 (03-28-23 @ 04:55) (47/18 - 113/60)  RR: 17 (03-28-23 @ 04:55) (14 - 20)  SpO2: 98% (03-28-23 @ 04:55) (95% - 100%)  Wt(kg): --    I&O's Summary  27 Mar 2023 07:01  -  28 Mar 2023 06:32  --------------------------------------------------------  IN: 1250 mL / OUT: 2125 mL / NET: -875 mL      CAPILLARY BLOOD GLUCOSE  POCT Blood Glucose.: 123 mg/dL (27 Mar 2023 22:53)      MEDICATIONS  (STANDING):  acetaminophen   IVPB .. 700 milliGRAM(s) IV Intermittent every 6 hours  famotidine Injectable 20 milliGRAM(s) IV Push once  ketorolac   Injectable 15 milliGRAM(s) IV Push every 8 hours  lactated ringers. 1000 milliLiter(s) (125 mL/Hr) IV Continuous <Continuous>  magnesium sulfate  IVPB 2 Gram(s) IV Intermittent once  senna 2 Tablet(s) Oral at bedtime  simethicone 80 milliGRAM(s) Chew every 8 hours  sodium chloride 0.9% lock flush 3 milliLiter(s) IV Push every 8 hours    MEDICATIONS  (PRN):  ondansetron Injectable 4 milliGRAM(s) IV Push every 8 hours PRN Nausea and/or Vomiting      Physical Exam:  Constitutional: NAD, A+O x3  CV: RRR  Lungs: clear to auscultation bilaterally  Abdomen: soft, nondistended, appropriately tender, no guarding/rebound, normal bowel sounds  Incision: midline vertical clean, dry, intact with dermabond prineo in place  Extremities: no lower extremity edema or calf tenderness bilaterally; venodynes in place    LABS:               8.1    10.56 )-----------( 129      ( 03-28 @ 05:25 )             23.9                6.3    13.19 )-----------( 198      ( 03-27 @ 23:32 )             18.9                11.8   6.80  )-----------( 247      ( 03-17 @ 17:20 )             36.7       03-28  139    |  106    |  32<H>  ----------------------------<  115<H>  4.0     |  21<L>  |  0.44<L>    03-27  135    |  104    |  30<H>  ----------------------------<  152<H>  3.9     |  19<L>  |  0.41<L>    Ca    7.6<L>      28 Mar 2023 05:25  Ca    7.4<L>      27 Mar 2023 23:03  Phos  3.7       03-28  Phos  3.9       03-27  Mg     1.80      03-28  Mg     1.80      03-27    TPro  4.3<L>  /  Alb  2.6<L>  /  TBili  0.4    /  DBili  x      /  AST  12     /  ALT  9      /  AlkPhos  39<L>  03-27    PT/INR - ( 27 Mar 2023 23:03 )   PT: 15.3 sec;   INR: 1.32 ratio       PTT - ( 27 Mar 2023 23:03 )  PTT:19.1 sec   Gyn ONC Progress Note POD#1/HD#2    Subjective: Patient seen and examined at bedside. Overnight, patient had an episode of hypotension, abdominal pain, and nausea, w/ acute drop in H/H c/f active bleed, now s/p RRT and RTOR with evacuation of 500cc hemoperitoneum. This morning, patient has no acute complaints. She reports her pain is well controlled. Patient has not yet ambulated, Marr is still in place. NPO overnight. Has not yet passed flatus. Denies lightheadedness, dizziness, CP, SOB, N/V, fevers, and chills.    Objective:  T(F): 98.5 (03-28-23 @ 04:55), Max: 99.2 (03-27-23 @ 22:58)  HR: 97 (03-28-23 @ 04:55) (63 - 131)  BP: 103/52 (03-28-23 @ 04:55) (47/18 - 113/60)  RR: 17 (03-28-23 @ 04:55) (14 - 20)  SpO2: 98% (03-28-23 @ 04:55) (95% - 100%)  Wt(kg): --    I&O's Summary  27 Mar 2023 07:01  -  28 Mar 2023 06:32  --------------------------------------------------------  IN: 1250 mL / OUT: 2125 mL / NET: -875 mL      CAPILLARY BLOOD GLUCOSE  POCT Blood Glucose.: 123 mg/dL (27 Mar 2023 22:53)      MEDICATIONS  (STANDING):  acetaminophen   IVPB .. 700 milliGRAM(s) IV Intermittent every 6 hours  famotidine Injectable 20 milliGRAM(s) IV Push once  ketorolac   Injectable 15 milliGRAM(s) IV Push every 8 hours  lactated ringers. 1000 milliLiter(s) (125 mL/Hr) IV Continuous <Continuous>  magnesium sulfate  IVPB 2 Gram(s) IV Intermittent once  senna 2 Tablet(s) Oral at bedtime  simethicone 80 milliGRAM(s) Chew every 8 hours  sodium chloride 0.9% lock flush 3 milliLiter(s) IV Push every 8 hours    MEDICATIONS  (PRN):  ondansetron Injectable 4 milliGRAM(s) IV Push every 8 hours PRN Nausea and/or Vomiting      Physical Exam:  Constitutional: NAD, A+O x3  CV: RRR  Lungs: clear to auscultation bilaterally  Abdomen: soft, nondistended, appropriately tender, no guarding/rebound, normal bowel sounds  Incision: midline vertical clean, dry, intact with dermabond prineo in place  Extremities: no lower extremity edema or calf tenderness bilaterally; venodynes in place    LABS:               8.1    10.56 )-----------( 129      ( 03-28 @ 05:25 )             23.9                6.3    13.19 )-----------( 198      ( 03-27 @ 23:32 )             18.9                11.8   6.80  )-----------( 247      ( 03-17 @ 17:20 )             36.7       03-28  139    |  106    |  32<H>  ----------------------------<  115<H>  4.0     |  21<L>  |  0.44<L>    03-27  135    |  104    |  30<H>  ----------------------------<  152<H>  3.9     |  19<L>  |  0.41<L>    Ca    7.6<L>      28 Mar 2023 05:25  Ca    7.4<L>      27 Mar 2023 23:03  Phos  3.7       03-28  Phos  3.9       03-27  Mg     1.80      03-28  Mg     1.80      03-27    TPro  4.3<L>  /  Alb  2.6<L>  /  TBili  0.4    /  DBili  x      /  AST  12     /  ALT  9      /  AlkPhos  39<L>  03-27    PT/INR - ( 27 Mar 2023 23:03 )   PT: 15.3 sec;   INR: 1.32 ratio       PTT - ( 27 Mar 2023 23:03 )  PTT:19.1 sec   Gyn ONC Progress Note POD#1/HD#2    Subjective: Patient seen and examined at bedside. Overnight, patient had an episode of hypotension, abdominal pain, and nausea, w/ acute drop in H/H c/f active bleed, now s/p RRT and RTOR with evacuation of 500cc hemoperitoneum. This morning, patient has no acute complaints. She reports her pain is well controlled. Patient has not yet ambulated, Marr is still in place. NPO overnight. Has not yet passed flatus. Denies lightheadedness, dizziness, CP, SOB, N/V, fevers, and chills.    Objective:  T(F): 98.5 (03-28-23 @ 04:55), Max: 99.2 (03-27-23 @ 22:58)  HR: 97 (03-28-23 @ 04:55) (63 - 131)  BP: 103/52 (03-28-23 @ 04:55) (47/18 - 113/60)  RR: 17 (03-28-23 @ 04:55) (14 - 20)  SpO2: 98% (03-28-23 @ 04:55) (95% - 100%)  Wt(kg): --    I&O's Summary  27 Mar 2023 07:01  -  28 Mar 2023 06:32  --------------------------------------------------------  IN: 1250 mL / OUT: 2125 mL / NET: -875 mL      CAPILLARY BLOOD GLUCOSE  POCT Blood Glucose.: 123 mg/dL (27 Mar 2023 22:53)      MEDICATIONS  (STANDING):  acetaminophen   IVPB .. 700 milliGRAM(s) IV Intermittent every 6 hours  famotidine Injectable 20 milliGRAM(s) IV Push once  ketorolac   Injectable 15 milliGRAM(s) IV Push every 8 hours  lactated ringers. 1000 milliLiter(s) (125 mL/Hr) IV Continuous <Continuous>  magnesium sulfate  IVPB 2 Gram(s) IV Intermittent once  senna 2 Tablet(s) Oral at bedtime  simethicone 80 milliGRAM(s) Chew every 8 hours  sodium chloride 0.9% lock flush 3 milliLiter(s) IV Push every 8 hours    MEDICATIONS  (PRN):  ondansetron Injectable 4 milliGRAM(s) IV Push every 8 hours PRN Nausea and/or Vomiting      Physical Exam:  Constitutional: NAD, A+O x3  CV: RRR  Lungs: clear to auscultation bilaterally  Abdomen: soft, nondistended, appropriately tender, no guarding/rebound, normal bowel sounds  Incision: midline vertical clean, dry, intact with dermabond prineo in place  Extremities: no lower extremity edema or calf tenderness bilaterally; venodynes in place    LABS:             6.3    13.19 )-----------( 198      ( 03-27 @ 23:32 )             18.9                11.8   6.80  )-----------( 247      ( 03-17 @ 17:20 )             36.7     03-27  135    |  104    |  30<H>  ----------------------------<  152<H>  3.9     |  19<L>  |  0.41<L>    Ca    7.6<L>      28 Mar 2023 05:25  Ca    7.4<L>      27 Mar 2023 23:03  Phos  3.7       03-28  Phos  3.9       03-27  Mg     1.80      03-28  Mg     1.80      03-27    TPro  4.3<L>  /  Alb  2.6<L>  /  TBili  0.4    /  DBili  x      /  AST  12     /  ALT  9      /  AlkPhos  39<L>  03-27    PT/INR - ( 27 Mar 2023 23:03 )   PT: 15.3 sec;   INR: 1.32 ratio       PTT - ( 27 Mar 2023 23:03 )  PTT:19.1 sec

## 2023-03-28 NOTE — BRIEF OPERATIVE NOTE - OPERATION/FINDINGS
Exam under anesthesia:   - normal external genitalia  - 8 week size anteverted uterus  - enlarged mass in left adnexa    Intraabdominal findings:  - left ovary enlarged by 20cm simple cyst with straw colored fluid  - grossly normal right ovary and bilateral fallopian tube   - uterus non-enlarged and smooth with no exophytic masses  - appendix visualized/normal, normal omentum
Upon entry into abdominal cavity, approximately 500cc of hemoperitoneum noted. The previously placed left ovarian compression sutures were removed and the ovarian bed cauterized and closed. Grossly normal appearing right adnexa, uterus, left fallopian tube, omentum, bowel, and bladder. Seprafilm placed over left ovary as well as omentum.

## 2023-03-28 NOTE — CHART NOTE - NSCHARTNOTEFT_GEN_A_CORE
GI paged for evaluation for alternative sources for anemia.  Patient has had complicated hospital course with exploratory laparotomy and left ovarian cystectomy c/b hypotension and acute blood loss anemia requiring pRBC transfusion and RTOR on 3/28/2023 with evacuation of 500cc of hemoperitoneum and cauterization of ovarian bed.      # Acute blood loss anemia:  Patient has been hypotensive with drop in Hg and rising BUN in setting of surgery detailed above, but she has no evidence of overt GI bleeding at this time.  CTA performed without active extravasation into GI tract, however small volume hemoperitoneum again noted.  Would favor hemoglobin most reflective of recent surgery and hemoperitoneum noted on recent operative report and CTA read.      Recommendations:  -trend vitals, CBC, and monitor for clinical signs of bleeding  -maintain active type and screen  -transfusion goal to maintain hemoglobin >/= 7.0 and platelets >/= 50  -avoid NSAIDs  -PPI IV BID  -no plans for emergent endoscopy overnight given lack of overt GI bleeding, no active extravasation into GI tract on CTA, and recent surgical findings  -keep NPO and can consider nonurgent EGD pending clinical course  -full consult note to follow  -remainder per primary team    Leighton Ribeiro  GI/Hepatology Fellow    MONDAY-FRIDAY 8AM-5PM:  Pager# [phone] ([facility]) or [phone] ([facility])    NON-URGENT CONSULTS:  Please email [email or web address]  AT NIGHT AND ON WEEKENDS:  Contact on-call GI fellow via answering service ([phone]) from 5pm-8am and on weekends/holidays

## 2023-03-29 LAB
ANION GAP SERPL CALC-SCNC: 8 MMOL/L — SIGNIFICANT CHANGE UP (ref 7–14)
APTT BLD: 27.1 SEC — SIGNIFICANT CHANGE UP (ref 27–36.3)
APTT BLD: 27.7 SEC — SIGNIFICANT CHANGE UP (ref 27–36.3)
APTT BLD: 27.7 SEC — SIGNIFICANT CHANGE UP (ref 27–36.3)
APTT BLD: 28.2 SEC — SIGNIFICANT CHANGE UP (ref 27–36.3)
BASOPHILS # BLD AUTO: 0.03 K/UL — SIGNIFICANT CHANGE UP (ref 0–0.2)
BASOPHILS NFR BLD AUTO: 0.4 % — SIGNIFICANT CHANGE UP (ref 0–2)
BUN SERPL-MCNC: 40 MG/DL — HIGH (ref 7–23)
CALCIUM SERPL-MCNC: 7.8 MG/DL — LOW (ref 8.4–10.5)
CHLORIDE SERPL-SCNC: 112 MMOL/L — HIGH (ref 98–107)
CO2 SERPL-SCNC: 23 MMOL/L — SIGNIFICANT CHANGE UP (ref 22–31)
CREAT SERPL-MCNC: 0.41 MG/DL — LOW (ref 0.5–1.3)
EGFR: 136 ML/MIN/1.73M2 — SIGNIFICANT CHANGE UP
EOSINOPHIL # BLD AUTO: 0.03 K/UL — SIGNIFICANT CHANGE UP (ref 0–0.5)
EOSINOPHIL NFR BLD AUTO: 0.4 % — SIGNIFICANT CHANGE UP (ref 0–6)
GLUCOSE SERPL-MCNC: 77 MG/DL — SIGNIFICANT CHANGE UP (ref 70–99)
HCT VFR BLD CALC: 22.6 % — LOW (ref 34.5–45)
HCT VFR BLD CALC: 22.6 % — LOW (ref 34.5–45)
HCT VFR BLD CALC: 23.8 % — LOW (ref 34.5–45)
HCT VFR BLD CALC: 24.9 % — LOW (ref 34.5–45)
HGB BLD-MCNC: 7.6 G/DL — LOW (ref 11.5–15.5)
HGB BLD-MCNC: 7.7 G/DL — LOW (ref 11.5–15.5)
HGB BLD-MCNC: 8 G/DL — LOW (ref 11.5–15.5)
HGB BLD-MCNC: 8.4 G/DL — LOW (ref 11.5–15.5)
IANC: 4.31 K/UL — SIGNIFICANT CHANGE UP (ref 1.8–7.4)
IMM GRANULOCYTES NFR BLD AUTO: 0.4 % — SIGNIFICANT CHANGE UP (ref 0–0.9)
INR BLD: 1.03 RATIO — SIGNIFICANT CHANGE UP (ref 0.88–1.16)
INR BLD: 1.1 RATIO — SIGNIFICANT CHANGE UP (ref 0.88–1.16)
INR BLD: 1.13 RATIO — SIGNIFICANT CHANGE UP (ref 0.88–1.16)
LYMPHOCYTES # BLD AUTO: 2.86 K/UL — SIGNIFICANT CHANGE UP (ref 1–3.3)
LYMPHOCYTES # BLD AUTO: 35.8 % — SIGNIFICANT CHANGE UP (ref 13–44)
MAGNESIUM SERPL-MCNC: 2.1 MG/DL — SIGNIFICANT CHANGE UP (ref 1.6–2.6)
MCHC RBC-ENTMCNC: 30.2 PG — SIGNIFICANT CHANGE UP (ref 27–34)
MCHC RBC-ENTMCNC: 30.2 PG — SIGNIFICANT CHANGE UP (ref 27–34)
MCHC RBC-ENTMCNC: 30.9 PG — SIGNIFICANT CHANGE UP (ref 27–34)
MCHC RBC-ENTMCNC: 30.9 PG — SIGNIFICANT CHANGE UP (ref 27–34)
MCHC RBC-ENTMCNC: 33.6 GM/DL — SIGNIFICANT CHANGE UP (ref 32–36)
MCHC RBC-ENTMCNC: 33.6 GM/DL — SIGNIFICANT CHANGE UP (ref 32–36)
MCHC RBC-ENTMCNC: 33.7 GM/DL — SIGNIFICANT CHANGE UP (ref 32–36)
MCHC RBC-ENTMCNC: 34.1 GM/DL — SIGNIFICANT CHANGE UP (ref 32–36)
MCV RBC AUTO: 88.6 FL — SIGNIFICANT CHANGE UP (ref 80–100)
MCV RBC AUTO: 89.7 FL — SIGNIFICANT CHANGE UP (ref 80–100)
MCV RBC AUTO: 91.5 FL — SIGNIFICANT CHANGE UP (ref 80–100)
MCV RBC AUTO: 91.9 FL — SIGNIFICANT CHANGE UP (ref 80–100)
MONOCYTES # BLD AUTO: 0.74 K/UL — SIGNIFICANT CHANGE UP (ref 0–0.9)
MONOCYTES NFR BLD AUTO: 9.3 % — SIGNIFICANT CHANGE UP (ref 2–14)
NEUTROPHILS # BLD AUTO: 4.31 K/UL — SIGNIFICANT CHANGE UP (ref 1.8–7.4)
NEUTROPHILS NFR BLD AUTO: 53.7 % — SIGNIFICANT CHANGE UP (ref 43–77)
NRBC # BLD: 0 /100 WBCS — SIGNIFICANT CHANGE UP (ref 0–0)
NRBC # FLD: 0 K/UL — SIGNIFICANT CHANGE UP (ref 0–0)
PHOSPHATE SERPL-MCNC: 2.2 MG/DL — LOW (ref 2.5–4.5)
PLATELET # BLD AUTO: 118 K/UL — LOW (ref 150–400)
PLATELET # BLD AUTO: 129 K/UL — LOW (ref 150–400)
PLATELET # BLD AUTO: 130 K/UL — LOW (ref 150–400)
PLATELET # BLD AUTO: 136 K/UL — LOW (ref 150–400)
POTASSIUM SERPL-MCNC: 4.3 MMOL/L — SIGNIFICANT CHANGE UP (ref 3.5–5.3)
POTASSIUM SERPL-SCNC: 4.3 MMOL/L — SIGNIFICANT CHANGE UP (ref 3.5–5.3)
PROTHROM AB SERPL-ACNC: 12 SEC — SIGNIFICANT CHANGE UP (ref 10.5–13.4)
PROTHROM AB SERPL-ACNC: 12.8 SEC — SIGNIFICANT CHANGE UP (ref 10.5–13.4)
PROTHROM AB SERPL-ACNC: 13.1 SEC — SIGNIFICANT CHANGE UP (ref 10.5–13.4)
RBC # BLD: 2.52 M/UL — LOW (ref 3.8–5.2)
RBC # BLD: 2.55 M/UL — LOW (ref 3.8–5.2)
RBC # BLD: 2.59 M/UL — LOW (ref 3.8–5.2)
RBC # BLD: 2.72 M/UL — LOW (ref 3.8–5.2)
RBC # FLD: 14.8 % — HIGH (ref 10.3–14.5)
RBC # FLD: 15.4 % — HIGH (ref 10.3–14.5)
RBC # FLD: 15.4 % — HIGH (ref 10.3–14.5)
RBC # FLD: 15.6 % — HIGH (ref 10.3–14.5)
SODIUM SERPL-SCNC: 143 MMOL/L — SIGNIFICANT CHANGE UP (ref 135–145)
WBC # BLD: 7.46 K/UL — SIGNIFICANT CHANGE UP (ref 3.8–10.5)
WBC # BLD: 8 K/UL — SIGNIFICANT CHANGE UP (ref 3.8–10.5)
WBC # BLD: 8.27 K/UL — SIGNIFICANT CHANGE UP (ref 3.8–10.5)
WBC # BLD: 8.6 K/UL — SIGNIFICANT CHANGE UP (ref 3.8–10.5)
WBC # FLD AUTO: 7.46 K/UL — SIGNIFICANT CHANGE UP (ref 3.8–10.5)
WBC # FLD AUTO: 8 K/UL — SIGNIFICANT CHANGE UP (ref 3.8–10.5)
WBC # FLD AUTO: 8.27 K/UL — SIGNIFICANT CHANGE UP (ref 3.8–10.5)
WBC # FLD AUTO: 8.6 K/UL — SIGNIFICANT CHANGE UP (ref 3.8–10.5)

## 2023-03-29 PROCEDURE — 99222 1ST HOSP IP/OBS MODERATE 55: CPT

## 2023-03-29 RX ORDER — POTASSIUM PHOSPHATE, MONOBASIC POTASSIUM PHOSPHATE, DIBASIC 236; 224 MG/ML; MG/ML
15 INJECTION, SOLUTION INTRAVENOUS ONCE
Refills: 0 | Status: COMPLETED | OUTPATIENT
Start: 2023-03-29 | End: 2023-03-29

## 2023-03-29 RX ORDER — ACETAMINOPHEN 500 MG
700 TABLET ORAL EVERY 6 HOURS
Refills: 0 | Status: COMPLETED | OUTPATIENT
Start: 2023-03-29 | End: 2023-03-30

## 2023-03-29 RX ORDER — SODIUM CHLORIDE 9 MG/ML
1000 INJECTION, SOLUTION INTRAVENOUS
Refills: 0 | Status: DISCONTINUED | OUTPATIENT
Start: 2023-03-29 | End: 2023-03-31

## 2023-03-29 RX ORDER — ACETAMINOPHEN 500 MG
700 TABLET ORAL EVERY 8 HOURS
Refills: 0 | Status: DISCONTINUED | OUTPATIENT
Start: 2023-03-29 | End: 2023-03-29

## 2023-03-29 RX ADMIN — Medication 700 MILLIGRAM(S): at 01:37

## 2023-03-29 RX ADMIN — POTASSIUM PHOSPHATE, MONOBASIC POTASSIUM PHOSPHATE, DIBASIC 62.5 MILLIMOLE(S): 236; 224 INJECTION, SOLUTION INTRAVENOUS at 10:26

## 2023-03-29 RX ADMIN — SODIUM CHLORIDE 80 MILLILITER(S): 9 INJECTION, SOLUTION INTRAVENOUS at 23:32

## 2023-03-29 RX ADMIN — SODIUM CHLORIDE 3 MILLILITER(S): 9 INJECTION INTRAMUSCULAR; INTRAVENOUS; SUBCUTANEOUS at 21:03

## 2023-03-29 RX ADMIN — PANTOPRAZOLE SODIUM 40 MILLIGRAM(S): 20 TABLET, DELAYED RELEASE ORAL at 06:34

## 2023-03-29 RX ADMIN — Medication 700 MILLIGRAM(S): at 07:34

## 2023-03-29 RX ADMIN — Medication 280 MILLIGRAM(S): at 06:34

## 2023-03-29 RX ADMIN — SODIUM CHLORIDE 3 MILLILITER(S): 9 INJECTION INTRAMUSCULAR; INTRAVENOUS; SUBCUTANEOUS at 14:24

## 2023-03-29 RX ADMIN — Medication 280 MILLIGRAM(S): at 18:36

## 2023-03-29 RX ADMIN — FAMOTIDINE 20 MILLIGRAM(S): 10 INJECTION INTRAVENOUS at 12:43

## 2023-03-29 RX ADMIN — Medication 280 MILLIGRAM(S): at 01:07

## 2023-03-29 RX ADMIN — Medication 700 MILLIGRAM(S): at 13:00

## 2023-03-29 RX ADMIN — SODIUM CHLORIDE 125 MILLILITER(S): 9 INJECTION, SOLUTION INTRAVENOUS at 01:08

## 2023-03-29 RX ADMIN — HYDROMORPHONE HYDROCHLORIDE 0.5 MILLIGRAM(S): 2 INJECTION INTRAMUSCULAR; INTRAVENOUS; SUBCUTANEOUS at 04:40

## 2023-03-29 RX ADMIN — SODIUM CHLORIDE 3 MILLILITER(S): 9 INJECTION INTRAMUSCULAR; INTRAVENOUS; SUBCUTANEOUS at 06:42

## 2023-03-29 RX ADMIN — Medication 700 MILLIGRAM(S): at 19:03

## 2023-03-29 RX ADMIN — Medication 280 MILLIGRAM(S): at 12:37

## 2023-03-29 RX ADMIN — HYDROMORPHONE HYDROCHLORIDE 0.5 MILLIGRAM(S): 2 INJECTION INTRAMUSCULAR; INTRAVENOUS; SUBCUTANEOUS at 04:10

## 2023-03-29 RX ADMIN — BENZOCAINE AND MENTHOL 1 LOZENGE: 5; 1 LIQUID ORAL at 13:34

## 2023-03-29 RX ADMIN — Medication 280 MILLIGRAM(S): at 23:31

## 2023-03-29 RX ADMIN — PANTOPRAZOLE SODIUM 40 MILLIGRAM(S): 20 TABLET, DELAYED RELEASE ORAL at 18:37

## 2023-03-29 NOTE — PROGRESS NOTE ADULT - ASSESSMENT
33yo F POD#2 s/p ex lap, left ovarian cystectomy (EBL 50cc) c/b sRRT on POD#0 for acute blood loss anemia and hypotension with RTOR (3/28) for ex lap, evacuation of 500cc of hemoperitoneum and cauterization of left ovarian bed. She is s/p 4u pRBC, 1u PLT, and 1u FFP due to repeat labs which showed h/h 6.3/18.6 with elevated PT/INR, with appropriate rise initial rise of H/H. CT Angio showed small volume hemoperitoneum, likely reflective of post operative state, and stomach enlargement with heterogenous contents, c/f possible upper GI bleed. Overnight, patient again with downtrending blood counts, however remains asymptomatic with stable vitals at this time. Patient is currently vital stable and clinically well appearing without hematemesis or significant abdominal pain.    Neuro: pain well controlled on IV Tylenol.  - Dilaudid 0.5/1mg PRN q6h ordered, patient has not requested  CV/Heme: f/u AM CBC and Coags  - c/f acute blood loss anemia  - sp 4u pRBC/1u Plt/1u FFP  - SCDs while in bed for DVT ppx; chemical DVT ppx being held  Pulm: O2 sat wnl on RA, encourage incentive spirometry use  GI: NPO, with strict precautions, c/f potential GI bleed   - Pantoprazole 40mg IV BID  - Pepcid 20mg IV QD  - Holding NSAIDs  - f/u GI consult full recs, appreciate assistance  : Marr in place, UOP adequate  - Stricts Is/Os  FEN: LR@125; f/u BMP, replete electrolytes prn  ID: afebrile, no signs of infection    Seen w/ GynOnc Team  ADomney PGY-4

## 2023-03-29 NOTE — PROGRESS NOTE ADULT - SUBJECTIVE AND OBJECTIVE BOX
ANESTHESIA POSTOP CHECK    32y Female POSTOP DAY 1 S/P     Vital Signs Last 24 Hrs  T(C): 36.9 (29 Mar 2023 06:34), Max: 38.3 (28 Mar 2023 19:40)  T(F): 98.4 (29 Mar 2023 06:34), Max: 101 (28 Mar 2023 19:40)  HR: 89 (29 Mar 2023 06:34) (86 - 110)  BP: 99/56 (29 Mar 2023 06:34) (80/48 - 101/57)  BP(mean): --  RR: 18 (29 Mar 2023 06:34) (16 - 18)  SpO2: 98% (29 Mar 2023 06:34) (98% - 100%)    Parameters below as of 29 Mar 2023 06:34  Patient On (Oxygen Delivery Method): room air      I&O's Summary    28 Mar 2023 07:01  -  29 Mar 2023 07:00  --------------------------------------------------------  IN: 3918 mL / OUT: 3800 mL / NET: 118 mL        [X ] NO APPARENT ANESTHESIA COMPLICATIONS      Comments:

## 2023-03-29 NOTE — CONSULT NOTE ADULT - ASSESSMENT
31yo F POD#2 s/p ex lap, left ovarian cystectomy (EBL 50cc) c/b sRRT on POD#0 for acute blood loss anemia and hypotension with RTOR (3/28) for ex lap, evacuation of 500cc of hemoperitoneum and cauterization of left ovarian bed. She is s/p 4u pRBC, 1u PLT, and 1u FFP due to repeat labs which showed h/h 6.3/18.6 with elevated PT/INR, with appropriate rise initial rise of H/H. CT Angio showed small volume hemoperitoneum and blush of contrast in the left adnexa suspicious for active bleeding, unclear if this is arterial or venous bleeding. Overnight, patient again with downtrending blood counts, GI called for alternative cause of anemia.     # Acute blood loss anemia:  Patient has been hypotensive with drop in Hg and rising BUN in setting of surgery detailed above with CTA noticing blush of contrast in the left adnexa suspicious for active bleeding. Suspect large amount of high density material in the stomach due to patient PO intake yesterday + narcotics, low suspicion for GI bleeding.    Recommendations:  -Would not recommend FIT or Guiac of stool  -trend vitals, CBC, and monitor for clinical signs of bleeding  -maintain active type and screen  -transfusion goal to maintain hemoglobin >/= 7.0 and platelets >/= 50  -avoid NSAIDs  -PPI daily  -no plans for endoscopy at this time unless patient hospital course changes  -Okay to start clear liquid diet and advance if Hb stable this afternoon   -Would start on bowel regimen (Miralax BID) given on narcotics     Recommendations preliminary until signed by attending.     Daniel Rod MD  Gastroenterology/Hepatology Fellow  1st option: [phone] (text or call), ONLY available from 7:00 am to 5:00 pm.   **Contact on-call GI fellow via answering service ([phone]) from 5pm-7am AND on weekends/holidays**  2nd option: Available via [employer] Teams  3rd option: Pager: [phone]

## 2023-03-29 NOTE — PROGRESS NOTE ADULT - SUBJECTIVE AND OBJECTIVE BOX
Gyn ONC Progress Note POD#2/#1 RTOR, HD#3    Subjective:   Pt seen and examined at bedside. Overnight, patient with unremarkable serial abdominal exams, however noted to have again downtrended H/H. Patient remains asymptomatic at this time. Denies lightheadedness, dizziness, SOB, chest pain, feeling heart beat quickly. Reports abdominal pain overnight, however states since she's gotten IV Tylenol, pain is well controlled. Last received at 1am, patient reports no pain at this time. Denies nausea/vomiting. Denies hematemesis/melena. Has not ambulated. Requesting again to drink water.    Objective:  T(F): 98.1 (03-29-23 @ 01:10), Max: 101 (03-28-23 @ 19:40)  HR: 92 (03-29-23 @ 04:45) (86 - 110)  BP: 100/56 (03-29-23 @ 04:45) (80/48 - 101/57)  RR: 18 (03-29-23 @ 01:10) (16 - 18)  SpO2: 99% (03-29-23 @ 01:10) (98% - 100%)  Wt(kg): --  I&O's Summary    27 Mar 2023 07:01  -  28 Mar 2023 07:00  --------------------------------------------------------  IN: 1375 mL / OUT: 2125 mL / NET: -750 mL    28 Mar 2023 07:01  -  29 Mar 2023 06:31  --------------------------------------------------------  IN: 2598 mL / OUT: 3800 mL / NET: -1202 mL      MEDICATIONS  (STANDING):  acetaminophen   IVPB .. 700 milliGRAM(s) IV Intermittent every 6 hours  famotidine Injectable 20 milliGRAM(s) IV Push daily  lactated ringers. 1000 milliLiter(s) (125 mL/Hr) IV Continuous <Continuous>  lidocaine 2% Gel 1 Application(s) Topical once  pantoprazole  Injectable 40 milliGRAM(s) IV Push two times a day  sodium chloride 0.9% lock flush 3 milliLiter(s) IV Push every 8 hours  tetracaine/benzocaine/butamben Spray 1 Spray(s) Topical once    MEDICATIONS  (PRN):  benzocaine/menthol Lozenge 1 Lozenge Oral three times a day PRN Sore Throat  HYDROmorphone  Injectable 0.5 milliGRAM(s) IV Push every 6 hours PRN Moderate Pain (4 - 6)  HYDROmorphone  Injectable 1 milliGRAM(s) IV Push every 6 hours PRN Severe Pain (7 - 10)  ondansetron Injectable 4 milliGRAM(s) IV Push every 8 hours PRN Nausea and/or Vomiting      Physical Exam:  Constitutional: NAD, A+O x3, resting comfortably in bed  CV: RRR  Lungs: clear to auscultation bilaterally  Abdomen: soft, appropriate mild tenderness throughout/in proximity to incision, no guarding, no rebound, no rigidity  Incision: midline vertical incision clean, dry, intact w/ dermabond prineo and abdominal binder in place  Extremities: no lower extremity edema or calf tenderness bilaterally; venodynes in place    LABS:             7.7    8.60  )-----------( 136      ( 03-29 @ 00:55 )             22.6                9.2    8.49  )-----------( 104      ( 03-28 @ 18:42 )             26.9                7.3    9.12  )-----------( 119      ( 03-28 @ 14:29 )             22.0                6.3    9.29  )-----------( 127      ( 03-28 @ 09:50 )             18.6       03-28    139    |  106    |  32<H>  ----------------------------<  115<H>  4.0     |  21<L>  |  0.44<L>  03-27    135    |  104    |  30<H>  ----------------------------<  152<H>  3.9     |  19<L>  |  0.41<L>    Ca    7.6<L>      28 Mar 2023 05:25  Ca    7.4<L>      27 Mar 2023 23:03  Phos  3.7       03-28  Phos  3.9       03-27  Mg     1.80      03-28  Mg     1.80      03-27    TPro  x      /  Alb  x      /  TBili  0.5    /  DBili  <0.2   /  AST  x      /  ALT  x      /  AlkPhos  x      03-28  TPro  4.3<L>  /  Alb  2.6<L>  /  TBili  0.4    /  DBili  x      /  AST  12     /  ALT  9      /  AlkPhos  39<L>  03-27    PT/INR - ( 29 Mar 2023 00:55 )   PT: 13.1 sec;   INR: 1.13 ratio         PTT - ( 29 Mar 2023 00:55 )  PTT:27.7 sec

## 2023-03-29 NOTE — CHART NOTE - NSCHARTNOTEFT_GEN_A_CORE
R2 GYN ONC PM Rounds    Patient sleeping at time of evaluation. Patient left to sleep, was not interrupted.      T(C): 37.3 (03-29-23 @ 13:47), Max: 37.3 (03-29-23 @ 13:47)  HR: 81 (03-29-23 @ 13:47) (81 - 92)  BP: 98/51 (03-29-23 @ 13:47) (98/51 - 100/56)  RR: 17 (03-29-23 @ 13:47) (16 - 18)  SpO2: 100% (03-29-23 @ 13:47) (98% - 100%)                            8.0    8.27  )-----------( 130      ( 29 Mar 2023 12:20 )             23.8       03-29    143  |  112<H>  |  40<H>  ----------------------------<  77  4.3   |  23  |  0.41<L>    Ca    7.8<L>      29 Mar 2023 06:00  Phos  2.2     03-29  Mg     2.10     03-29    TPro  x   /  Alb  x   /  TBili  0.5  /  DBili  <0.2  /  AST  x   /  ALT  x   /  AlkPhos  x   03-28        -Appreciate GI recs: CLD, Low suspicion for GI bleed, no plan for endoscopy at this time, PPI daily, avoid NSAIDs  -currently CLD  -H/H stable today, continue to monitor  -monitor VS    E Ladi PGY2

## 2023-03-29 NOTE — CONSULT NOTE ADULT - SUBJECTIVE AND OBJECTIVE BOX
HPI:  33yo F POD#2 s/p ex lap, left ovarian cystectomy (EBL 50cc) c/b sRRT on POD#0 for acute blood loss anemia and hypotension with RTOR (3/28) for ex lap, evacuation of 500cc of hemoperitoneum and cauterization of left ovarian bed. She is s/p 4u pRBC, 1u PLT, and 1u FFP due to repeat labs which showed h/h 6.3/18.6 with elevated PT/INR, with appropriate rise initial rise of H/H. CT Angio showed small volume hemoperitoneum, likely reflective of post operative state and Blush of contrast in the left adnexa suspicious for active bleeding, unclear if this is arterial or venous bleeding. Overnight, patient again with downtrending blood counts, GI called for alternative cause of anemia.     Allergies:  No Known Allergies        Hospital Medications:  acetaminophen   IVPB .. 700 milliGRAM(s) IV Intermittent every 6 hours  benzocaine/menthol Lozenge 1 Lozenge Oral three times a day PRN  famotidine Injectable 20 milliGRAM(s) IV Push daily  HYDROmorphone  Injectable 0.5 milliGRAM(s) IV Push every 6 hours PRN  HYDROmorphone  Injectable 1 milliGRAM(s) IV Push every 6 hours PRN  lactated ringers. 1000 milliLiter(s) IV Continuous <Continuous>  lidocaine 2% Gel 1 Application(s) Topical once  ondansetron Injectable 4 milliGRAM(s) IV Push every 8 hours PRN  pantoprazole  Injectable 40 milliGRAM(s) IV Push two times a day  sodium chloride 0.9% lock flush 3 milliLiter(s) IV Push every 8 hours  tetracaine/benzocaine/butamben Spray 1 Spray(s) Topical once      PMHX/PSHX:  No pertinent past medical history    Intra-abdominal and pelvic swelling of mass or lump    No significant past surgical history    No significant past surgical history        Family history:  No pertinent family history in first degree relatives        Social History: no smoking    ROS:   General:  No fevers, chills or night sweats  ENT:  No sore throat or dysphagia  CV:  No pain or palpitations  Resp:  No dyspnea, cough or  wheezing  GI:  as above  Skin:  No rash or edema  Neuro: no weakness   Hematologic: no bleeding  Musculoskeletal: no muscle pain or join pain  Psych: no agitation     : no dysuria      PHYSICAL EXAM:   GENERAL:  NAD, Appears stated age  HEENT:  NC/AT,  conjunctivae clear and pink, sclera -anicteric  CHEST:  CTA B/L, Normal effort  HEART:  RRR S1/S2,  ABDOMEN:  Soft, incision, CINTHYA empty   EXTREMITIES:  No cyanosis or Edema  SKIN:  Warm & Dry. No rash or erythema  NEURO:  Alert, oriented, no focal deficit    Vital Signs:  Vital Signs Last 24 Hrs  T(C): 36.6 (29 Mar 2023 10:58), Max: 38.3 (28 Mar 2023 19:40)  T(F): 97.9 (29 Mar 2023 10:58), Max: 101 (28 Mar 2023 19:40)  HR: 89 (29 Mar 2023 06:34) (86 - 110)  BP: 99/56 (29 Mar 2023 06:34) (80/48 - 101/57)  BP(mean): --  RR: 16 (29 Mar 2023 10:58) (16 - 18)  SpO2: 98% (29 Mar 2023 10:58) (98% - 100%)    Parameters below as of 29 Mar 2023 10:58  Patient On (Oxygen Delivery Method): room air      Daily     Daily     LABS:                        7.6    8.00  )-----------( 129      ( 29 Mar 2023 06:00 )             22.6     Mean Cell Volume: 89.7 fL (03-29-23 @ 06:00)    03-29    143  |  112<H>  |  40<H>  ----------------------------<  77  4.3   |  23  |  0.41<L>    Ca    7.8<L>      29 Mar 2023 06:00  Phos  2.2     03-29  Mg     2.10     03-29    TPro  x   /  Alb  x   /  TBili  0.5  /  DBili  <0.2  /  AST  x   /  ALT  x   /  AlkPhos  x   03-28    LIVER FUNCTIONS - ( 27 Mar 2023 23:03 )  Alb: 2.6 g/dL / Pro: 4.3 g/dL / ALK PHOS: 39 U/L / ALT: 9 U/L / AST: 12 U/L / GGT: x           PT/INR - ( 29 Mar 2023 06:00 )   PT: 12.8 sec;   INR: 1.10 ratio         PTT - ( 29 Mar 2023 06:00 )  PTT:27.1 sec                            7.6    8.00  )-----------( 129      ( 29 Mar 2023 06:00 )             22.6                         7.7    8.60  )-----------( 136      ( 29 Mar 2023 00:55 )             22.6                         9.2    8.49  )-----------( 104      ( 28 Mar 2023 18:42 )             26.9                         7.3    9.12  )-----------( 119      ( 28 Mar 2023 14:29 )             22.0                         6.3    9.29  )-----------( 127      ( 28 Mar 2023 09:50 )             18.6     Imaging:  < from: CT Angio Abdomen and Pelvis w/ IV Cont (03.28.23 @ 15:26) >    BLADDER: Marr catheter.  REPRODUCTIVE ORGANS: Patient is status post pelvic cyst removal. Small   volume hemoperitoneum. Blush of contrast in the left adnexa (4, 128),   unclear if this is arterial or venous bleeding as the left gonadal vein   is opacified on the early phase imaging.    BOWEL: No bowel obstruction. Appendix is normal.  PERITONEUM: Small volume hemopneumoperitoneum.  VESSELS: Within normal limits.  RETROPERITONEUM/LYMPH NODES: No lymphadenopathy.  ABDOMINAL WALL: Postsurgical changes.  BONES: Within normal limits.    IMPRESSION:  Small volume pelvic hemoperitoneum. Blush of contrast in the left adnexa   suspicious for active bleeding, unclear if this is arterial or venous   bleeding.    Findings were discussed with OB/GYN team on 3/28/2023 3:52 PM by Dr. Caraballo with read back confirmation.      < end of copied text >

## 2023-03-29 NOTE — CHART NOTE - NSCHARTNOTEFT_GEN_A_CORE
Patient seen and evaluated at 11 am today.   Feels much better. Reports less epigastric pain.   Minimal lower abdominal pain.   No nausea. Some flatus yesterday. No BM yet. No nausea.   Exam - abdomen soft and appropriately tender. Incision C/D/I    POD 2 after ELAP and cystectomy with re-op 12 hours post op for suspected intra-abdominal bleeding after rapid response and fall in hgb.   At time of repeat exploration, only small amount of blood in abdomen, approximately 300 cc.   Ovary was hemostatic and no site of active bleeding was identified.     Imaging after second episode of hypotension and fall in Hgb with large amount of high density material in stomach - in patient who had been NPO since surgery.   Most likely to represent upper GI bleeding.   Awaiting GI evaluation.   On high dose PPI.   Plan to guiac all stools.   Plan of care discussed in detail with patient and her  and all questions answered.

## 2023-03-29 NOTE — CONSULT NOTE ADULT - ATTENDING COMMENTS
Agree with fellow assessment/plan.     32F POD2 s/p L ovarian cystectomy c/b hypotension with return to OR on 3/28 for ex lap and evaculation of hemoperitoneum and cauterization of L ovarian bed. Repeat drop in hgb led to CTA which showed a blush in left adnexa suspicious for active bleeding, unclear if arterial or venous.   GI consulted for possible UGIB. Reportedly CT images of stomach show distension with debris. patient reports she did eat last evening. Is taking narcotics for pain. Has had no hematemesis, coffee ground emesis, melena or hematochezia. Drop in hgb and rise in BUN noted; however, with this volume of blood loss, would expect to see it luminally, from above or below.     Recommend:   - PPI once daily 40mg for stress ulcer ppx  - trend CBC and hemodynamics  - if concern for blood in stool or bloody emesis, please photograph and send via Teams, on a white background  - discussed in detail with patient and     Attempted to communicate with primary team housestaff but was in a meeting and did not call back.

## 2023-03-29 NOTE — CHART NOTE - NSCHARTNOTEFT_GEN_A_CORE
Discussed w/ GI team findings and recommendations over phone, low suspicion for GI bleed currently w/o plan for EGD. Will cont w/ PPI and trend H/H for now however given suspicion based on clinical course thusfar. Repeat CBC this afternoon stable and pt w/o sxs anemia. Cont to monitor.    Kamilla Lion MD  D/w Dr. Jo Discussed w/ GI team findings and recommendations over phone, low suspicion for GI bleed currently w/o plan for EGD. Will cont w/ PPI and trend H/H for now however given suspicion based on clinical course thusfar. Repeat CBC this afternoon stable and pt w/o sxs anemia. Advance to CLD. Cont to monitor.    Kamilla Lion MD  D/w Dr. Jo

## 2023-03-29 NOTE — CHART NOTE - NSCHARTNOTEFT_GEN_A_CORE
Pt reassessed for serial abdominal exam. Patient again resting comfortably. States she had some epigastric pain, but when questioned further about this, she states it was at the time of attempted NGT placement and no longer has these symptoms. Pelvic pain well controlled with Tylenol, declining IV Dilaudid at this time. Denies signs/sx of anemia. VSS. Physical exam unchanged from earlier.     Reviewed results of most recent labs with patient and her partner. I explained that given her stable appearance and vital signs, plan will be to repeat labs at 5am. Patient asking to have clears. I explained that the team will consider advancing diet after repeat AM labs and likely after GI evaluation. They expressed understanding of plan. All questions answered.     c/w plan as previously discussed with Dr. Katcher RFrankel PGY4

## 2023-03-30 LAB
ANION GAP SERPL CALC-SCNC: 12 MMOL/L — SIGNIFICANT CHANGE UP (ref 7–14)
APTT BLD: 28.3 SEC — SIGNIFICANT CHANGE UP (ref 27–36.3)
APTT BLD: 28.9 SEC — SIGNIFICANT CHANGE UP (ref 27–36.3)
APTT BLD: 29.8 SEC — SIGNIFICANT CHANGE UP (ref 27–36.3)
BLD GP AB SCN SERPL QL: NEGATIVE — SIGNIFICANT CHANGE UP
BUN SERPL-MCNC: 9 MG/DL — SIGNIFICANT CHANGE UP (ref 7–23)
CALCIUM SERPL-MCNC: 8.5 MG/DL — SIGNIFICANT CHANGE UP (ref 8.4–10.5)
CHLORIDE SERPL-SCNC: 106 MMOL/L — SIGNIFICANT CHANGE UP (ref 98–107)
CO2 SERPL-SCNC: 21 MMOL/L — LOW (ref 22–31)
CREAT SERPL-MCNC: 0.42 MG/DL — LOW (ref 0.5–1.3)
EGFR: 135 ML/MIN/1.73M2 — SIGNIFICANT CHANGE UP
GLUCOSE SERPL-MCNC: 87 MG/DL — SIGNIFICANT CHANGE UP (ref 70–99)
HCG SERPL-ACNC: <5 MIU/ML — SIGNIFICANT CHANGE UP
HCT VFR BLD CALC: 23.6 % — LOW (ref 34.5–45)
HCT VFR BLD CALC: 23.7 % — LOW (ref 34.5–45)
HCT VFR BLD CALC: 28.1 % — LOW (ref 34.5–45)
HGB BLD-MCNC: 7.9 G/DL — LOW (ref 11.5–15.5)
HGB BLD-MCNC: 7.9 G/DL — LOW (ref 11.5–15.5)
HGB BLD-MCNC: 9.3 G/DL — LOW (ref 11.5–15.5)
INR BLD: 1.01 RATIO — SIGNIFICANT CHANGE UP (ref 0.88–1.16)
INR BLD: 1.05 RATIO — SIGNIFICANT CHANGE UP (ref 0.88–1.16)
MCHC RBC-ENTMCNC: 30.2 PG — SIGNIFICANT CHANGE UP (ref 27–34)
MCHC RBC-ENTMCNC: 30.7 PG — SIGNIFICANT CHANGE UP (ref 27–34)
MCHC RBC-ENTMCNC: 30.8 PG — SIGNIFICANT CHANGE UP (ref 27–34)
MCHC RBC-ENTMCNC: 33.1 GM/DL — SIGNIFICANT CHANGE UP (ref 32–36)
MCHC RBC-ENTMCNC: 33.3 GM/DL — SIGNIFICANT CHANGE UP (ref 32–36)
MCHC RBC-ENTMCNC: 33.5 GM/DL — SIGNIFICANT CHANGE UP (ref 32–36)
MCV RBC AUTO: 90.1 FL — SIGNIFICANT CHANGE UP (ref 80–100)
MCV RBC AUTO: 92.2 FL — SIGNIFICANT CHANGE UP (ref 80–100)
MCV RBC AUTO: 93 FL — SIGNIFICANT CHANGE UP (ref 80–100)
NON-GYNECOLOGICAL CYTOLOGY STUDY: SIGNIFICANT CHANGE UP
NRBC # BLD: 0 /100 WBCS — SIGNIFICANT CHANGE UP (ref 0–0)
NRBC # FLD: 0 K/UL — SIGNIFICANT CHANGE UP (ref 0–0)
NRBC # FLD: 0 K/UL — SIGNIFICANT CHANGE UP (ref 0–0)
NRBC # FLD: 0.02 K/UL — HIGH (ref 0–0)
OB PNL STL: POSITIVE
PLATELET # BLD AUTO: 109 K/UL — LOW (ref 150–400)
PLATELET # BLD AUTO: 139 K/UL — LOW (ref 150–400)
PLATELET # BLD AUTO: 153 K/UL — SIGNIFICANT CHANGE UP (ref 150–400)
POTASSIUM SERPL-MCNC: 3.9 MMOL/L — SIGNIFICANT CHANGE UP (ref 3.5–5.3)
POTASSIUM SERPL-SCNC: 3.9 MMOL/L — SIGNIFICANT CHANGE UP (ref 3.5–5.3)
PROTHROM AB SERPL-ACNC: 11.7 SEC — SIGNIFICANT CHANGE UP (ref 10.5–13.4)
PROTHROM AB SERPL-ACNC: 12.2 SEC — SIGNIFICANT CHANGE UP (ref 10.5–13.4)
RBC # BLD: 2.57 M/UL — LOW (ref 3.8–5.2)
RBC # BLD: 2.62 M/UL — LOW (ref 3.8–5.2)
RBC # BLD: 3.02 M/UL — LOW (ref 3.8–5.2)
RBC # FLD: 14.4 % — SIGNIFICANT CHANGE UP (ref 10.3–14.5)
RBC # FLD: 14.7 % — HIGH (ref 10.3–14.5)
RBC # FLD: 14.9 % — HIGH (ref 10.3–14.5)
RH IG SCN BLD-IMP: POSITIVE — SIGNIFICANT CHANGE UP
SARS-COV-2 RNA SPEC QL NAA+PROBE: SIGNIFICANT CHANGE UP
SODIUM SERPL-SCNC: 139 MMOL/L — SIGNIFICANT CHANGE UP (ref 135–145)
WBC # BLD: 5.55 K/UL — SIGNIFICANT CHANGE UP (ref 3.8–10.5)
WBC # BLD: 6.35 K/UL — SIGNIFICANT CHANGE UP (ref 3.8–10.5)
WBC # BLD: 8.07 K/UL — SIGNIFICANT CHANGE UP (ref 3.8–10.5)
WBC # FLD AUTO: 5.55 K/UL — SIGNIFICANT CHANGE UP (ref 3.8–10.5)
WBC # FLD AUTO: 6.35 K/UL — SIGNIFICANT CHANGE UP (ref 3.8–10.5)
WBC # FLD AUTO: 8.07 K/UL — SIGNIFICANT CHANGE UP (ref 3.8–10.5)

## 2023-03-30 PROCEDURE — 99232 SBSQ HOSP IP/OBS MODERATE 35: CPT | Mod: GC

## 2023-03-30 RX ORDER — ACETAMINOPHEN 500 MG
700 TABLET ORAL EVERY 6 HOURS
Refills: 0 | Status: COMPLETED | OUTPATIENT
Start: 2023-03-30 | End: 2023-03-31

## 2023-03-30 RX ORDER — FAMOTIDINE 10 MG/ML
20 INJECTION INTRAVENOUS DAILY
Refills: 0 | Status: DISCONTINUED | OUTPATIENT
Start: 2023-03-30 | End: 2023-03-31

## 2023-03-30 RX ORDER — FAMOTIDINE 10 MG/ML
20 INJECTION INTRAVENOUS ONCE
Refills: 0 | Status: DISCONTINUED | OUTPATIENT
Start: 2023-03-30 | End: 2023-03-30

## 2023-03-30 RX ADMIN — Medication 700 MILLIGRAM(S): at 18:36

## 2023-03-30 RX ADMIN — SODIUM CHLORIDE 80 MILLILITER(S): 9 INJECTION, SOLUTION INTRAVENOUS at 18:21

## 2023-03-30 RX ADMIN — Medication 280 MILLIGRAM(S): at 12:04

## 2023-03-30 RX ADMIN — Medication 700 MILLIGRAM(S): at 12:20

## 2023-03-30 RX ADMIN — PANTOPRAZOLE SODIUM 40 MILLIGRAM(S): 20 TABLET, DELAYED RELEASE ORAL at 18:25

## 2023-03-30 RX ADMIN — FAMOTIDINE 20 MILLIGRAM(S): 10 INJECTION INTRAVENOUS at 12:04

## 2023-03-30 RX ADMIN — ONDANSETRON 4 MILLIGRAM(S): 8 TABLET, FILM COATED ORAL at 10:28

## 2023-03-30 RX ADMIN — SODIUM CHLORIDE 3 MILLILITER(S): 9 INJECTION INTRAMUSCULAR; INTRAVENOUS; SUBCUTANEOUS at 06:00

## 2023-03-30 RX ADMIN — Medication 700 MILLIGRAM(S): at 00:01

## 2023-03-30 RX ADMIN — SODIUM CHLORIDE 80 MILLILITER(S): 9 INJECTION, SOLUTION INTRAVENOUS at 08:26

## 2023-03-30 RX ADMIN — Medication 700 MILLIGRAM(S): at 06:36

## 2023-03-30 RX ADMIN — Medication 280 MILLIGRAM(S): at 18:21

## 2023-03-30 RX ADMIN — PANTOPRAZOLE SODIUM 40 MILLIGRAM(S): 20 TABLET, DELAYED RELEASE ORAL at 06:06

## 2023-03-30 RX ADMIN — SODIUM CHLORIDE 3 MILLILITER(S): 9 INJECTION INTRAMUSCULAR; INTRAVENOUS; SUBCUTANEOUS at 13:02

## 2023-03-30 RX ADMIN — SODIUM CHLORIDE 3 MILLILITER(S): 9 INJECTION INTRAMUSCULAR; INTRAVENOUS; SUBCUTANEOUS at 22:22

## 2023-03-30 RX ADMIN — Medication 280 MILLIGRAM(S): at 06:06

## 2023-03-30 NOTE — PROGRESS NOTE ADULT - ASSESSMENT
31yo F POD#3 s/p ex lap, left ovarian cystectomy (EBL 50cc) c/b sRRT on POD#0 for acute blood loss anemia and hypotension with RTOR (3/28) for ex lap, evacuation of 500cc of hemoperitoneum and cauterization of left ovarian bed. She is s/p 4u pRBC, 1u Plt, and 1u FFP. CT Angio showed small volume hemoperitoneum, likely reflective of post operative state, and stomach enlargement with heterogenous contents, c/f possible upper GI bleed. Overnight with stable H/H over multiple serial labs, abdominal exam continues to remain unremarkable. Patient appears vitally and clinically stable at this time.     Neuro: pain well controlled on IV Tylenol.  - Dilaudid 0.5/1mg PRN q6h ordered, patient has not requested  CV/Heme: f/u 8AM CBC and Coags  - stable H/H x 5 (3/29-3/30)  - c/f acute blood loss anemia  - sp 4u pRBC/1u Plt/1u FFP  - SCDs while in bed for DVT ppx; chemical DVT ppx being held  Pulm: O2 sat wnl on RA, encourage incentive spirometry use  GI: CLD  - Pantoprazole 40mg IV BID  - Pepcid 20mg IV QD  - Guiacs w/ each stool - NO BM yet  - Holding NSAIDs  - GI: recommending prophylaxis w/ Pantoprazole BID, not planned for EGD at this time  : Marr in place, UOP adequate  - Stricts Is/Os  FEN: LR@80; f/u BMP, replete electrolytes prn  ID: afebrile, no signs of infection    Seen w/ GynOnc Team  ADomney PGY-4

## 2023-03-30 NOTE — PROGRESS NOTE ADULT - SUBJECTIVE AND OBJECTIVE BOX
Interval Events:   reported black tarry stool per patient, team and nursing staff    Hospital Medications:  benzocaine/menthol Lozenge 1 Lozenge Oral three times a day PRN  famotidine Injectable 20 milliGRAM(s) IV Push daily  HYDROmorphone  Injectable 0.5 milliGRAM(s) IV Push every 6 hours PRN  HYDROmorphone  Injectable 1 milliGRAM(s) IV Push every 6 hours PRN  lactated ringers. 1000 milliLiter(s) IV Continuous <Continuous>  lidocaine 2% Gel 1 Application(s) Topical once  ondansetron Injectable 4 milliGRAM(s) IV Push every 8 hours PRN  pantoprazole  Injectable 40 milliGRAM(s) IV Push two times a day  sodium chloride 0.9% lock flush 3 milliLiter(s) IV Push every 8 hours  tetracaine/benzocaine/butamben Spray 1 Spray(s) Topical once      ROS: All system reviewed and negative except as mentioned above.    PHYSICAL EXAM:   Vital Signs:  Vital Signs Last 24 Hrs  T(C): 37 (30 Mar 2023 10:25), Max: 37.3 (29 Mar 2023 13:47)  T(F): 98.6 (30 Mar 2023 10:25), Max: 99.1 (29 Mar 2023 13:47)  HR: 97 (30 Mar 2023 10:35) (70 - 113)  BP: 98/54 (30 Mar 2023 10:25) (95/50 - 108/61)  BP(mean): --  RR: 17 (30 Mar 2023 10:25) (16 - 17)  SpO2: 100% (30 Mar 2023 10:25) (99% - 100%)    Parameters below as of 30 Mar 2023 10:25  Patient On (Oxygen Delivery Method): room air      Daily     Daily     GENERAL:  NAD, Appears stated age  HEENT:  NC/AT,  conjunctivae clear and pink, sclera -anicteric  CHEST:  Normal Effort, Breath sounds clear  HEART:  RRR, S1 + S2, no murmurs  ABDOMEN:  Soft, non-tender, surgical incision  EXTREMITIES:  no cyanosis or edema  SKIN:  Warm & Dry. No rash or erythema  NEURO:  Alert, oriented, no focal deficit    LABS:                        9.3    8.07  )-----------( 139      ( 30 Mar 2023 08:00 )             28.1     Mean Cell Volume: 93.0 fL (03-30-23 @ 08:00)    03-29    143  |  112<H>  |  40<H>  ----------------------------<  77  4.3   |  23  |  0.41<L>    Ca    7.8<L>      29 Mar 2023 06:00  Phos  2.2     03-29  Mg     2.10     03-29    TPro  x   /  Alb  x   /  TBili  0.5  /  DBili  <0.2  /  AST  x   /  ALT  x   /  AlkPhos  x   03-28      PT/INR - ( 30 Mar 2023 02:14 )   PT: 12.2 sec;   INR: 1.05 ratio         PTT - ( 30 Mar 2023 08:00 )  PTT:29.8 sec                            9.3    8.07  )-----------( 139      ( 30 Mar 2023 08:00 )             28.1                         7.9    6.35  )-----------( 109      ( 30 Mar 2023 02:14 )             23.6                         8.4    7.46  )-----------( 118      ( 29 Mar 2023 18:00 )             24.9                         8.0    8.27  )-----------( 130      ( 29 Mar 2023 12:20 )             23.8                         7.6    8.00  )-----------( 129      ( 29 Mar 2023 06:00 )             22.6       Imaging: Images reviewed.

## 2023-03-30 NOTE — PROGRESS NOTE ADULT - SUBJECTIVE AND OBJECTIVE BOX
Gyn ONC Progress Note POD #3, HD#4    Subjective:   Pt seen and examined at bedside. No events overnight. Pain well controlled - highest 6/10 prior to medications. Patient ambulating, minimally. Passing flatus. Tolerating clears. Pt denies fever, chills, chest pain, SOB, nausea, vomiting, lightheadedness, dizziness.      Objective:  T(F): 97.2 (03-30-23 @ 06:06), Max: 99.1 (03-29-23 @ 13:47)  HR: 87 (03-30-23 @ 06:06) (70 - 89)  BP: 95/50 (03-30-23 @ 06:06) (95/50 - 108/61)  RR: 16 (03-30-23 @ 06:06) (16 - 18)  SpO2: 100% (03-30-23 @ 06:06) (98% - 100%)  Wt(kg): --  I&O's Summary    28 Mar 2023 07:01  -  29 Mar 2023 07:00  --------------------------------------------------------  IN: 3918 mL / OUT: 3800 mL / NET: 118 mL    29 Mar 2023 07:01  -  30 Mar 2023 06:22  --------------------------------------------------------  IN: 3085 mL / OUT: 3550 mL / NET: -465 mL      MEDICATIONS  (STANDING):  acetaminophen   IVPB .. 700 milliGRAM(s) IV Intermittent every 6 hours  famotidine Injectable 20 milliGRAM(s) IV Push daily  lactated ringers. 1000 milliLiter(s) (80 mL/Hr) IV Continuous <Continuous>  lidocaine 2% Gel 1 Application(s) Topical once  pantoprazole  Injectable 40 milliGRAM(s) IV Push two times a day  sodium chloride 0.9% lock flush 3 milliLiter(s) IV Push every 8 hours  tetracaine/benzocaine/butamben Spray 1 Spray(s) Topical once    MEDICATIONS  (PRN):  benzocaine/menthol Lozenge 1 Lozenge Oral three times a day PRN Sore Throat  HYDROmorphone  Injectable 0.5 milliGRAM(s) IV Push every 6 hours PRN Moderate Pain (4 - 6)  HYDROmorphone  Injectable 1 milliGRAM(s) IV Push every 6 hours PRN Severe Pain (7 - 10)  ondansetron Injectable 4 milliGRAM(s) IV Push every 8 hours PRN Nausea and/or Vomiting      Physical Exam:  Constitutional: NAD, A+O x3  CV: RRR  Lungs: clear to auscultation bilaterally  Abdomen: soft, appropriate tenderness, nondistended, no guarding, no rebound, normal bowel sounds  Incision: midline vertical with dermabond prineo, clean, dry, intact  Extremities: no lower extremity edema or calf tenderness bilaterally; venodynes in place      LABS:             7.9    6.35  )-----------( 109      ( 03-30 @ 02:14 )             23.6                8.4    7.46  )-----------( 118      ( 03-29 @ 18:00 )             24.9                8.0    8.27  )-----------( 130      ( 03-29 @ 12:20 )             23.8           03-29    143    |  112<H>  |  40<H>  ----------------------------<  77     4.3     |  23     |  0.41<L>    Ca    7.8<L>      29 Mar 2023 06:00  Phos  2.2       03-29  Mg     2.10      03-29        PT/INR - ( 30 Mar 2023 02:14 )   PT: 12.2 sec;   INR: 1.05 ratio    PTT - ( 30 Mar 2023 02:14 )  PTT:28.3 sec

## 2023-03-30 NOTE — CHART NOTE - NSCHARTNOTEFT_GEN_A_CORE
Patient seen and evaluated this am.   Just had large stool - melanotic in appearance.   Guiac positive.   Hgb stable. Feels better. VSS.   Remains on high dose PPI.   Reconsulted GI for evaluation - while acute event seems to have subsided would like to determine if patient is at risk for further episodes of GI bleeding and what outpatient followup would be appropriate.   Plan of care reviewed with patient and her .

## 2023-03-30 NOTE — CHART NOTE - NSCHARTNOTEFT_GEN_A_CORE
GI Brief Note:    GI following for anemia, Hb stable.     No further work up from our perspective. Please reach out to us for any question/concern or change in patient hospital course.     Rest of recs per Note from 3/29/23        Daniel Rod MD  Gastroenterology/Hepatology Fellow  Contact on-call GI fellow via answering service ([phone]) from 5pm-8am AND on weekends/holidays

## 2023-03-30 NOTE — PROGRESS NOTE ADULT - ASSESSMENT
33yo F POD#2 s/p ex lap, left ovarian cystectomy (EBL 50cc) c/b sRRT on POD#0 for acute blood loss anemia and hypotension with RTOR (3/28) for ex lap, evacuation of 500cc of hemoperitoneum and cauterization of left ovarian bed. She is s/p 4u pRBC, 1u PLT, and 1u FFP due to repeat labs which showed h/h 6.3/18.6 with elevated PT/INR, with appropriate rise initial rise of H/H. CT Angio showed small volume hemoperitoneum and blush of contrast in the left adnexa suspicious for active bleeding, unclear if this is arterial or venous bleeding. Overnight, patient again with downtrending blood counts, GI called for alternative cause of anemia.     # Acute blood loss anemia:   # Concern for UGIB  Patient has been hypotensive with drop in Hg on the 28th and rising BUN in setting of surgery detailed above with CTA noticing blush of contrast in the left adnexa suspicious for active bleeding. Upon further discussion with surgical team, these findings are consistent with postsurgical changes. on 3/29, patient CINTHYA with no overt GI bleeding but now patient (on 3/30) with reported dark stool concerning for possible UGIB (patient/ deferred CINTHYA). DDx for UGIB includes PUD vs AVM vs dieulafoy lesion vs mass. Hb stable in the last 24 hrs.     Recommendations:  -PPI IV BID  -NPO after midnight  -Plan for EGD tomorrow  -Will need repeat COVID swab  -Despite Hb stable, would obtain Hb this afternoon to make sure Hb not downtrending   -Hb goal >7      Recommendations preliminary until signed by attending.     Daniel Rod MD  Gastroenterology/Hepatology Fellow  1st option: [phone] (text or call), ONLY available from 7:00 am to 5:00 pm.   **Contact on-call GI fellow via answering service ([phone]) from 5pm-7am AND on weekends/holidays**  2nd option: Available via [employer] Teams  3rd option: Pager: [phone]           33yo F POD#2 s/p ex lap, left ovarian cystectomy (EBL 50cc) c/b sRRT on POD#0 for acute blood loss anemia and hypotension with RTOR (3/28) for ex lap, evacuation of 500cc of hemoperitoneum and cauterization of left ovarian bed. She is s/p 4u pRBC, 1u PLT, and 1u FFP due to repeat labs which showed h/h 6.3/18.6 with elevated PT/INR, with appropriate rise initial rise of H/H. CT Angio showed small volume hemoperitoneum and blush of contrast in the left adnexa suspicious for active bleeding, unclear if this is arterial or venous bleeding. Overnight, patient again with downtrending blood counts, GI called for alternative cause of anemia.     # Acute blood loss anemia:   # Concern for UGIB  Patient has been hypotensive with drop in Hg on the 28th and rising BUN in setting of surgery detailed above with CTA noticing blush of contrast in the left adnexa suspicious for active bleeding. Upon further discussion with surgical team, these findings are consistent with postsurgical changes. on 3/29, patient CINTHYA with no overt GI bleeding but now patient (on 3/30) with reported dark stool concerning for possible UGIB (patient/ deferred CINTHYA). DDx for UGIB includes PUD vs AVM vs dieulafoy lesion vs mass. Hb stable in the last 24 hrs.     Recommendations:  -clear liquid diet   -PPI IV BID  -NPO after midnight (ordered placed)  -Plan for EGD tomorrow  -Will need repeat COVID swab (ordered)  -Needs negative pregnancy test w/in 24 hrs (ordered)  -Despite Hb stable, would obtain Hb this afternoon to make sure Hb not downtrending   -Hb goal >7      Recommendations preliminary until signed by attending.     Daniel Rod MD  Gastroenterology/Hepatology Fellow  1st option: [phone] (text or call), ONLY available from 7:00 am to 5:00 pm.   **Contact on-call GI fellow via answering service ([phone]) from 5pm-7am AND on weekends/holidays**  2nd option: Available via [employer] Teams  3rd option: Pager: [phone]

## 2023-03-30 NOTE — PROVIDER CONTACT NOTE (OTHER) - ASSESSMENT
Patient sitting in bed. Patient with c/o dizziness, nausea. BP 98/54, , temp 98.6, RR 17, pulse ox 100% on RA. Spouse at bedside.

## 2023-03-30 NOTE — CHART NOTE - NSCHARTNOTEFT_GEN_A_CORE
R4 PM Note    Patient evaluated at bedside this afternoon. Denies complaints. + OOB, Tolerating clears.  Voiding spontaneously. Earlier with melanotic stool, plan for EGD tomorrow.     Objective  T(C): 37.1 (03-30-23 @ 14:07), Max: 37.1 (03-30-23 @ 14:07)  HR: 86 (03-30-23 @ 14:07) (86 - 113)  BP: 93/54 (03-30-23 @ 14:07) (93/54 - 98/54)  RR: 16 (03-30-23 @ 14:07) (16 - 17)  SpO2: 100% (03-30-23 @ 14:07) (100% - 100%)  Wt(kg): --    03-29 @ 07:01  -  03-30 @ 07:00  --------------------------------------------------------  IN: 3085 mL / OUT: 4050 mL / NET: -965 mL    03-30 @ 07:01  -  03-30 @ 16:52  --------------------------------------------------------  IN: 870 mL / OUT: 1400 mL / NET: -530 mL      Gen: NAD  Abd: Soft NT  Ext: NT BL    acetaminophen   IVPB .. 700 milliGRAM(s) IV Intermittent every 6 hours  benzocaine/menthol Lozenge 1 Lozenge Oral three times a day PRN  famotidine Injectable 20 milliGRAM(s) IV Push daily  HYDROmorphone  Injectable 0.5 milliGRAM(s) IV Push every 6 hours PRN  HYDROmorphone  Injectable 1 milliGRAM(s) IV Push every 6 hours PRN  lactated ringers. 1000 milliLiter(s) IV Continuous <Continuous>  lidocaine 2% Gel 1 Application(s) Topical once  ondansetron Injectable 4 milliGRAM(s) IV Push every 8 hours PRN  pantoprazole  Injectable 40 milliGRAM(s) IV Push two times a day  sodium chloride 0.9% lock flush 3 milliLiter(s) IV Push every 8 hours  tetracaine/benzocaine/butamben Spray 1 Spray(s) Topical once      A/P: sp acute UGIB now with stable H/H x 24h, verified by melanotic stool and +guiaic of said stool. Assessment of risk for additional GIB warranted.  - GI rec CLD, NPO@MN, Pre-EGD testing - all ordered  - f/u 8pm CBC/BMP/Coags/HCG/T&S  - COVID neg  - CLD, c/w IVF  - NPO@MN ordered  - Plan for EGD tomorrow, will continue on all IV meds for now including PPI 40BID and hold NSAIDs    ADomney PGY-4

## 2023-03-31 LAB
APTT BLD: 28.2 SEC — SIGNIFICANT CHANGE UP (ref 27–36.3)
HCT VFR BLD CALC: 23.8 % — LOW (ref 34.5–45)
HCT VFR BLD CALC: 24.4 % — LOW (ref 34.5–45)
HGB BLD-MCNC: 7.9 G/DL — LOW (ref 11.5–15.5)
HGB BLD-MCNC: 8.4 G/DL — LOW (ref 11.5–15.5)
INR BLD: 1.02 RATIO — SIGNIFICANT CHANGE UP (ref 0.88–1.16)
MCHC RBC-ENTMCNC: 30.6 PG — SIGNIFICANT CHANGE UP (ref 27–34)
MCHC RBC-ENTMCNC: 31.3 PG — SIGNIFICANT CHANGE UP (ref 27–34)
MCHC RBC-ENTMCNC: 33.2 GM/DL — SIGNIFICANT CHANGE UP (ref 32–36)
MCHC RBC-ENTMCNC: 34.4 GM/DL — SIGNIFICANT CHANGE UP (ref 32–36)
MCV RBC AUTO: 91 FL — SIGNIFICANT CHANGE UP (ref 80–100)
MCV RBC AUTO: 92.2 FL — SIGNIFICANT CHANGE UP (ref 80–100)
NRBC # BLD: 0 /100 WBCS — SIGNIFICANT CHANGE UP (ref 0–0)
NRBC # BLD: 0 /100 WBCS — SIGNIFICANT CHANGE UP (ref 0–0)
NRBC # FLD: 0 K/UL — SIGNIFICANT CHANGE UP (ref 0–0)
NRBC # FLD: 0 K/UL — SIGNIFICANT CHANGE UP (ref 0–0)
PLATELET # BLD AUTO: 160 K/UL — SIGNIFICANT CHANGE UP (ref 150–400)
PLATELET # BLD AUTO: 216 K/UL — SIGNIFICANT CHANGE UP (ref 150–400)
PROTHROM AB SERPL-ACNC: 11.8 SEC — SIGNIFICANT CHANGE UP (ref 10.5–13.4)
RBC # BLD: 2.58 M/UL — LOW (ref 3.8–5.2)
RBC # BLD: 2.68 M/UL — LOW (ref 3.8–5.2)
RBC # FLD: 14.1 % — SIGNIFICANT CHANGE UP (ref 10.3–14.5)
RBC # FLD: 14.4 % — SIGNIFICANT CHANGE UP (ref 10.3–14.5)
WBC # BLD: 4.98 K/UL — SIGNIFICANT CHANGE UP (ref 3.8–10.5)
WBC # BLD: 5.04 K/UL — SIGNIFICANT CHANGE UP (ref 3.8–10.5)
WBC # FLD AUTO: 4.98 K/UL — SIGNIFICANT CHANGE UP (ref 3.8–10.5)
WBC # FLD AUTO: 5.04 K/UL — SIGNIFICANT CHANGE UP (ref 3.8–10.5)

## 2023-03-31 PROCEDURE — 43235 EGD DIAGNOSTIC BRUSH WASH: CPT | Mod: GC

## 2023-03-31 RX ORDER — SIMETHICONE 80 MG/1
80 TABLET, CHEWABLE ORAL THREE TIMES A DAY
Refills: 0 | Status: DISCONTINUED | OUTPATIENT
Start: 2023-03-31 | End: 2023-04-01

## 2023-03-31 RX ORDER — ACETAMINOPHEN 500 MG
975 TABLET ORAL EVERY 6 HOURS
Refills: 0 | Status: DISCONTINUED | OUTPATIENT
Start: 2023-03-31 | End: 2023-04-01

## 2023-03-31 RX ORDER — OXYCODONE HYDROCHLORIDE 5 MG/1
5 TABLET ORAL EVERY 6 HOURS
Refills: 0 | Status: DISCONTINUED | OUTPATIENT
Start: 2023-03-31 | End: 2023-04-01

## 2023-03-31 RX ORDER — PANTOPRAZOLE SODIUM 20 MG/1
40 TABLET, DELAYED RELEASE ORAL
Refills: 0 | Status: DISCONTINUED | OUTPATIENT
Start: 2023-03-31 | End: 2023-04-01

## 2023-03-31 RX ORDER — ONDANSETRON 8 MG/1
4 TABLET, FILM COATED ORAL EVERY 8 HOURS
Refills: 0 | Status: DISCONTINUED | OUTPATIENT
Start: 2023-03-31 | End: 2023-04-01

## 2023-03-31 RX ORDER — FAMOTIDINE 10 MG/ML
20 INJECTION INTRAVENOUS DAILY
Refills: 0 | Status: DISCONTINUED | OUTPATIENT
Start: 2023-03-31 | End: 2023-04-01

## 2023-03-31 RX ORDER — PANTOPRAZOLE SODIUM 20 MG/1
40 TABLET, DELAYED RELEASE ORAL DAILY
Refills: 0 | Status: DISCONTINUED | OUTPATIENT
Start: 2023-03-31 | End: 2023-03-31

## 2023-03-31 RX ADMIN — SODIUM CHLORIDE 80 MILLILITER(S): 9 INJECTION, SOLUTION INTRAVENOUS at 12:34

## 2023-03-31 RX ADMIN — Medication 975 MILLIGRAM(S): at 23:20

## 2023-03-31 RX ADMIN — SODIUM CHLORIDE 3 MILLILITER(S): 9 INJECTION INTRAMUSCULAR; INTRAVENOUS; SUBCUTANEOUS at 13:52

## 2023-03-31 RX ADMIN — Medication 280 MILLIGRAM(S): at 12:33

## 2023-03-31 RX ADMIN — Medication 700 MILLIGRAM(S): at 12:48

## 2023-03-31 RX ADMIN — Medication 700 MILLIGRAM(S): at 01:04

## 2023-03-31 RX ADMIN — PANTOPRAZOLE SODIUM 40 MILLIGRAM(S): 20 TABLET, DELAYED RELEASE ORAL at 05:25

## 2023-03-31 RX ADMIN — SODIUM CHLORIDE 3 MILLILITER(S): 9 INJECTION INTRAMUSCULAR; INTRAVENOUS; SUBCUTANEOUS at 22:15

## 2023-03-31 RX ADMIN — FAMOTIDINE 20 MILLIGRAM(S): 10 INJECTION INTRAVENOUS at 12:33

## 2023-03-31 RX ADMIN — SIMETHICONE 80 MILLIGRAM(S): 80 TABLET, CHEWABLE ORAL at 23:20

## 2023-03-31 RX ADMIN — Medication 975 MILLIGRAM(S): at 23:50

## 2023-03-31 RX ADMIN — Medication 280 MILLIGRAM(S): at 05:25

## 2023-03-31 RX ADMIN — Medication 700 MILLIGRAM(S): at 06:20

## 2023-03-31 RX ADMIN — SODIUM CHLORIDE 3 MILLILITER(S): 9 INJECTION INTRAMUSCULAR; INTRAVENOUS; SUBCUTANEOUS at 06:50

## 2023-03-31 RX ADMIN — Medication 280 MILLIGRAM(S): at 00:10

## 2023-03-31 NOTE — CHART NOTE - NSCHARTNOTESELECT_GEN_ALL_CORE
Chart Note
GYN Onc Event Note
R2  Post Op Check Note
R4 Update
Chart Note
Event Note
GYN ONC PM Rounds
Post op check
R2 GYN Onc PM Rounding Note
R4 PM Note
R4 Update
R4 Update
Rapid Response
gi

## 2023-03-31 NOTE — CHART NOTE - NSCHARTNOTEFT_GEN_A_CORE
GYN ONC PM Rounding    Patient evaluated at bedside this afternoon. Denies complaints. + OOB, Tolerating regular diet. Denies dizziness, fatigue, palpitations, SOB    Objective  T(C): 37 (03-31-23 @ 18:24), Max: 37 (03-31-23 @ 18:24)  HR: 90 (03-31-23 @ 18:24) (76 - 96)  BP: 97/53 (03-31-23 @ 18:24) (96/59 - 109/65)  RR: 17 (03-31-23 @ 18:24) (16 - 20)  SpO2: 100% (03-31-23 @ 18:24) (98% - 100%)  Wt(kg): --    03-30 @ 07:01  -  03-31 @ 07:00  --------------------------------------------------------  IN: 4460 mL / OUT: 3500 mL / NET: 960 mL    03-31 @ 07:01  -  03-31 @ 19:44  --------------------------------------------------------  IN: 640 mL / OUT: 400 mL / NET: 240 mL          acetaminophen     Tablet .. 975 milliGRAM(s) Oral every 6 hours  famotidine    Tablet 20 milliGRAM(s) Oral daily  ondansetron   Disintegrating Tablet 4 milliGRAM(s) Oral every 8 hours PRN  oxyCODONE    IR 5 milliGRAM(s) Oral every 6 hours PRN  pantoprazole    Tablet 40 milliGRAM(s) Oral before breakfast  simethicone 80 milliGRAM(s) Chew three times a day  sodium chloride 0.9% lock flush 3 milliLiter(s) IV Push every 8 hours    - EGD wnl  - Patient tolerating regular diet  - f/u 6pm CBC  - Continue pantoprazole daily and pepcid daily  - Transition to PO pain control    Concepcion Cardona, PGY1  d/w GYN ONC team Home

## 2023-03-31 NOTE — PROGRESS NOTE ADULT - ASSESSMENT
33yo F POD#4 s/p ex lap, left ovarian cystectomy (EBL 50cc) c/b sRRT on POD#0 for acute blood loss anemia and hypotension with RTOR (3/28) for ex lap, evacuation of 500cc of hemoperitoneum and cauterization of left ovarian bed. She is s/p 4u pRBC, 1u Plt, and 1u FFP. CT Angio showed small volume hemoperitoneum, likely reflective of post operative state, and stomach enlargement with heterogenous contents, c/f possible upper GI bleed. Yesterday with melanotic stool, confirming diagnosis. Today, plan for EGD by GI. Patient remains w/ stable vitals and stable H/H at this time. Clinically well appearing.     Neuro: pain well controlled on IV Tylenol.  - Dilaudid 0.5/1mg PRN q6h ordered, patient has not requested  CV/Heme: stable H/H over 48h, stable coags  - c/f acute blood loss anemia from upper GI bleed, sp melanotic stool +guaiac  - sp 4u pRBC/1u Plt/1u FFP  - SCDs while in bed for DVT ppx; chemical DVT ppx being held  Pulm: O2 sat wnl on RA, encourage incentive spirometry use  GI: NPO@MN, IVF  - Pantoprazole 40mg IV BID  - Pepcid 20mg IV QD  - +guaiac on melanotic stool (3/30), plan for EGD today  - Holding NSAIDs  - GI: recommending prophylaxis w/ Pantoprazole BID and scope today  : Marr in place, UOP adequate  - Stricts Is/Os  FEN: LR@80; f/u BMP, replete electrolytes prn  ID: afebrile, no signs of infection    Seen w/ GynOnc Team  AD[place]ney PGY-4

## 2023-03-31 NOTE — PROGRESS NOTE ADULT - SUBJECTIVE AND OBJECTIVE BOX
Gyn ONC Progress Note POD#4    Subjective:   Pt seen and examined at bedside. No events overnight. With headache this AM, but no abdominal pain. Patient ambulating. Passing flatus. NPO overnight for EGD this AM. Pt denies fever, chills, chest pain, SOB, nausea, vomiting, lightheadedness, dizziness.      Objective:  T(F): 99.9 (03-31-23 @ 05:15), Max: 99.9 (03-31-23 @ 05:15)  HR: 87 (03-31-23 @ 05:15) (72 - 113)  BP: 98/60 (03-31-23 @ 05:15) (93/54 - 98/60)  RR: 16 (03-31-23 @ 05:15) (16 - 17)  SpO2: 100% (03-31-23 @ 05:15) (100% - 100%)    I&O's Summary    29 Mar 2023 07:01  -  30 Mar 2023 07:00  --------------------------------------------------------  IN: 3085 mL / OUT: 4050 mL / NET: -965 mL    30 Mar 2023 07:01  -  31 Mar 2023 06:52  --------------------------------------------------------  IN: 4460 mL / OUT: 3500 mL / NET: 960 mL    MEDICATIONS  (STANDING):  acetaminophen   IVPB .. 700 milliGRAM(s) IV Intermittent every 6 hours  famotidine Injectable 20 milliGRAM(s) IV Push daily  lactated ringers. 1000 milliLiter(s) (80 mL/Hr) IV Continuous <Continuous>  lidocaine 2% Gel 1 Application(s) Topical once  pantoprazole  Injectable 40 milliGRAM(s) IV Push two times a day  sodium chloride 0.9% lock flush 3 milliLiter(s) IV Push every 8 hours  tetracaine/benzocaine/butamben Spray 1 Spray(s) Topical once    MEDICATIONS  (PRN):  benzocaine/menthol Lozenge 1 Lozenge Oral three times a day PRN Sore Throat  HYDROmorphone  Injectable 0.5 milliGRAM(s) IV Push every 6 hours PRN Moderate Pain (4 - 6)  HYDROmorphone  Injectable 1 milliGRAM(s) IV Push every 6 hours PRN Severe Pain (7 - 10)  ondansetron Injectable 4 milliGRAM(s) IV Push every 8 hours PRN Nausea and/or Vomiting      Physical Exam:  Constitutional: NAD, A+O x3  CV: RRR  Lungs: clear to auscultation bilaterally  Abdomen: soft, nondistended, no guarding, no rebound, normal bowel sounds  Incision: midline vertical incision, clean, dry, intact  Extremities: no lower extremity edema or calf tenderness bilaterally; venodynes in place    LABS:             7.9    4.98  )-----------( 160      ( 03-31 @ 04:47 )             23.8         03-30    139    |  106    |  9      ----------------------------<  87     3.9     |  21<L>  |  0.42<L>    Ca    8.5        30 Mar 2023 20:10          PT/INR - ( 31 Mar 2023 04:47 )   PT: 11.8 sec;   INR: 1.02 ratio         PTT - ( 31 Mar 2023 04:47 )  PTT:28.2 sec

## 2023-04-01 ENCOUNTER — TRANSCRIPTION ENCOUNTER (OUTPATIENT)
Age: 33
End: 2023-04-01

## 2023-04-01 VITALS
RESPIRATION RATE: 16 BRPM | TEMPERATURE: 98 F | DIASTOLIC BLOOD PRESSURE: 58 MMHG | OXYGEN SATURATION: 100 % | HEART RATE: 87 BPM | SYSTOLIC BLOOD PRESSURE: 116 MMHG

## 2023-04-01 LAB
ANION GAP SERPL CALC-SCNC: 12 MMOL/L — SIGNIFICANT CHANGE UP (ref 7–14)
BASOPHILS # BLD AUTO: 0.02 K/UL — SIGNIFICANT CHANGE UP (ref 0–0.2)
BASOPHILS NFR BLD AUTO: 0.4 % — SIGNIFICANT CHANGE UP (ref 0–2)
BUN SERPL-MCNC: 12 MG/DL — SIGNIFICANT CHANGE UP (ref 7–23)
CALCIUM SERPL-MCNC: 8.4 MG/DL — SIGNIFICANT CHANGE UP (ref 8.4–10.5)
CHLORIDE SERPL-SCNC: 103 MMOL/L — SIGNIFICANT CHANGE UP (ref 98–107)
CO2 SERPL-SCNC: 25 MMOL/L — SIGNIFICANT CHANGE UP (ref 22–31)
CREAT SERPL-MCNC: 0.34 MG/DL — LOW (ref 0.5–1.3)
EGFR: 142 ML/MIN/1.73M2 — SIGNIFICANT CHANGE UP
EOSINOPHIL # BLD AUTO: 0.15 K/UL — SIGNIFICANT CHANGE UP (ref 0–0.5)
EOSINOPHIL NFR BLD AUTO: 2.9 % — SIGNIFICANT CHANGE UP (ref 0–6)
GLUCOSE SERPL-MCNC: 94 MG/DL — SIGNIFICANT CHANGE UP (ref 70–99)
HCT VFR BLD CALC: 24.3 % — LOW (ref 34.5–45)
HGB BLD-MCNC: 8.2 G/DL — LOW (ref 11.5–15.5)
IANC: 3.14 K/UL — SIGNIFICANT CHANGE UP (ref 1.8–7.4)
IMM GRANULOCYTES NFR BLD AUTO: 0.4 % — SIGNIFICANT CHANGE UP (ref 0–0.9)
LYMPHOCYTES # BLD AUTO: 1.44 K/UL — SIGNIFICANT CHANGE UP (ref 1–3.3)
LYMPHOCYTES # BLD AUTO: 27.9 % — SIGNIFICANT CHANGE UP (ref 13–44)
MAGNESIUM SERPL-MCNC: 1.9 MG/DL — SIGNIFICANT CHANGE UP (ref 1.6–2.6)
MCHC RBC-ENTMCNC: 30.9 PG — SIGNIFICANT CHANGE UP (ref 27–34)
MCHC RBC-ENTMCNC: 33.7 GM/DL — SIGNIFICANT CHANGE UP (ref 32–36)
MCV RBC AUTO: 91.7 FL — SIGNIFICANT CHANGE UP (ref 80–100)
MONOCYTES # BLD AUTO: 0.39 K/UL — SIGNIFICANT CHANGE UP (ref 0–0.9)
MONOCYTES NFR BLD AUTO: 7.6 % — SIGNIFICANT CHANGE UP (ref 2–14)
NEUTROPHILS # BLD AUTO: 3.14 K/UL — SIGNIFICANT CHANGE UP (ref 1.8–7.4)
NEUTROPHILS NFR BLD AUTO: 60.8 % — SIGNIFICANT CHANGE UP (ref 43–77)
NRBC # BLD: 0 /100 WBCS — SIGNIFICANT CHANGE UP (ref 0–0)
NRBC # FLD: 0 K/UL — SIGNIFICANT CHANGE UP (ref 0–0)
PHOSPHATE SERPL-MCNC: 3.6 MG/DL — SIGNIFICANT CHANGE UP (ref 2.5–4.5)
PLATELET # BLD AUTO: 212 K/UL — SIGNIFICANT CHANGE UP (ref 150–400)
POTASSIUM SERPL-MCNC: 3.5 MMOL/L — SIGNIFICANT CHANGE UP (ref 3.5–5.3)
POTASSIUM SERPL-SCNC: 3.5 MMOL/L — SIGNIFICANT CHANGE UP (ref 3.5–5.3)
RBC # BLD: 2.65 M/UL — LOW (ref 3.8–5.2)
RBC # FLD: 13.9 % — SIGNIFICANT CHANGE UP (ref 10.3–14.5)
SODIUM SERPL-SCNC: 140 MMOL/L — SIGNIFICANT CHANGE UP (ref 135–145)
WBC # BLD: 5.16 K/UL — SIGNIFICANT CHANGE UP (ref 3.8–10.5)
WBC # FLD AUTO: 5.16 K/UL — SIGNIFICANT CHANGE UP (ref 3.8–10.5)

## 2023-04-01 RX ORDER — FAMOTIDINE 10 MG/ML
1 INJECTION INTRAVENOUS
Qty: 0 | Refills: 0 | DISCHARGE
Start: 2023-04-01

## 2023-04-01 RX ADMIN — SODIUM CHLORIDE 3 MILLILITER(S): 9 INJECTION INTRAMUSCULAR; INTRAVENOUS; SUBCUTANEOUS at 05:11

## 2023-04-01 RX ADMIN — Medication 975 MILLIGRAM(S): at 06:43

## 2023-04-01 RX ADMIN — Medication 975 MILLIGRAM(S): at 06:13

## 2023-04-01 RX ADMIN — PANTOPRAZOLE SODIUM 40 MILLIGRAM(S): 20 TABLET, DELAYED RELEASE ORAL at 06:13

## 2023-04-01 NOTE — PROGRESS NOTE ADULT - ATTENDING COMMENTS
31yo F POD#2 s/p ex lap, left ovarian cystectomy (EBL 50cc) c/b sRRT on POD#0 for acute blood loss anemia and hypotension with RTOR (3/28) for ex lap, evacuation of 500cc of hemoperitoneum and cauterization of left ovarian bed. She is s/p 4u pRBC, 1u PLT, and 1u FFP due to repeat labs which showed h/h 6.3/18.6 with elevated PT/INR, with appropriate rise initial rise of H/H. CT Angio showed small volume hemoperitoneum, likely reflective of post operative state, and stomach enlargement with heterogenous contents, c/f possible upper GI bleed. Overnight, patient again with downtrending blood counts, however remains asymptomatic with stable vitals at this time. Patient is currently vital stable and clinically well appearing without hematemesis or significant abdominal pain.    -AVSS, labs reviewed. Afebrile, no leukocytosis. Replete lytes as indicated.  -H/H stable, to continue to trend closely. UOP adequate, to continue to perform strict I/Os.  Appreciate GI consult given clinical concern for possible upper GI bleed that may have contributed to the patients acute blood loss anemia. Continue NPO, IVF, GI ppx.   -Pain well controlled with current pain medication regimen. Benign abdomen this morning. No vaginal bleeding.  -Ambulation and IS encouraged.  -Plan for continued inpatient management of postoperative care.   -All questions answered to patients satisfaction.    Deidre Haley MD      Division of Gynecologic Oncology    Department of Obstetrics and Gynecology  [facility]
Called to see patient for melena bm.   Patient and  reported black loose bm.   hgb from this morning 9.3   Would repeat CBC, BMP in pm  Agree with differential dx per fellow note  Agree with IV BID PPI 40mg  NPO after MN for EGD tomorrow
Patient seen and examined, agree with gyn onc fellow and resident  CBC stable  S/p EGD  Plan d/c home
Patient seen and examined at bedside, agree with above gyn resident note, including assessment and plan. Abdomen benign. Discussed today's plan of care with patient, all questions answered. Continue to monitor closely.
Patient seen and examined, agree with gyn onc fellow and resident  POD#3  Exam benign  Await AM labs
patient seen and evalauted at 3pm  feeling better since this am  blood transfusion started  BPs improved, abd soft/nt/nd, incision dry, no bleeding  will closely monitor, may need additional blood transfusion  CT angio to r/o any other site of bleeding  all questions answered

## 2023-04-01 NOTE — PROGRESS NOTE ADULT - SUBJECTIVE AND OBJECTIVE BOX
[facility] Progress Note POD #5    Pt seen, examined at bedside and doing well meeting all post operative milestones. Pt denies fever, chills, chest pain, SOB, nausea, vomiting, lightheadedness, dizziness.  Pt states passing flatus, No Melena this morning. Pt is s/p EGD which was wnl., no active bleeding.    T(F): 97.4 (04-01-23 @ 01:38), Max: 98.6 (03-31-23 @ 18:24)  HR: 87 (04-01-23 @ 01:38) (76 - 96)  BP: 99/70 (04-01-23 @ 01:38) (96/59 - 112/65)  RR: 16 (04-01-23 @ 01:38) (16 - 20)  SpO2: 100% (04-01-23 @ 01:38) (98% - 100%)    I&O's Detail    30 Mar 2023 07:01  -  31 Mar 2023 07:00  --------------------------------------------------------  IN:    IV PiggyBack: 860 mL    Lactated Ringers: 1680 mL    Oral Fluid: 1920 mL  Total IN: 4460 mL    OUT:    Indwelling Catheter - Urethral (mL): 700 mL    Voided (mL): 2800 mL  Total OUT: 3500 mL    Total NET: 960 mL      31 Mar 2023 07:01  -  01 Apr 2023 06:27  --------------------------------------------------------  IN:    Oral Fluid: 1120 mL  Total IN: 1120 mL    OUT:    Voided (mL): 900 mL  Total OUT: 900 mL    Total NET: 220 mL        MEDICATIONS  (STANDING):  acetaminophen     Tablet .. 975 milliGRAM(s) Oral every 6 hours  famotidine    Tablet 20 milliGRAM(s) Oral daily  pantoprazole    Tablet 40 milliGRAM(s) Oral before breakfast  simethicone 80 milliGRAM(s) Chew three times a day  sodium chloride 0.9% lock flush 3 milliLiter(s) IV Push every 8 hours    MEDICATIONS  (PRN):  ondansetron   Disintegrating Tablet 4 milliGRAM(s) Oral every 8 hours PRN Nausea and/or Vomiting  oxyCODONE    IR 5 milliGRAM(s) Oral every 6 hours PRN Severe Pain (7 - 10)      Physical Exam:  Constitutional: WDWN female, appears stated age  Psych: NAD AxOx3  Skin: no breakdowns noted, warm and dry  Chest: s1s2+, RRR, clear to auscultation bilaterally, no w/r/r    Abdomen: softly distended, no guarding, no rebound, + bowel sounds, appropriate tenderness noted   Incision site: vertical incision clean and dry with dermabond tape intact.  Abdominal binder in place  Extremities: no lower extremity edema or calf tenderness bilaterally; intermittent compression stockings in place     LABS:             8.4    5.04  )-----------( 216      ( 03-31 @ 18:19 )             24.4                7.9    4.98  )-----------( 160      ( 03-31 @ 04:47 )             23.8                7.9    5.55  )-----------( 153      ( 03-30 @ 20:20 )             23.7                9.3    8.07  )-----------( 139      ( 03-30 @ 08:00 )             28.1                7.9    6.35  )-----------( 109      ( 03-30 @ 02:14 )             23.6                8.4    7.46  )-----------( 118      ( 03-29 @ 18:00 )             24.9                8.0    8.27  )-----------( 130      ( 03-29 @ 12:20 )             23.8       03-30    139    |  106    |  9      ----------------------------<  87     3.9     |  21<L>  |  0.42<L>    Ca    8.5        30 Mar 2023 20:10          PT/INR - ( 31 Mar 2023 04:47 )   PT: 11.8 sec;   INR: 1.02 ratio    PTT - ( 31 Mar 2023 04:47 )  PTT:28.2 sec

## 2023-04-01 NOTE — DISCHARGE NOTE NURSING/CASE MANAGEMENT/SOCIAL WORK - NSDCPNINST_GEN_ALL_CORE
Please return to ED if you develop any nausea, vomiting, diarrhea or temp of 100.4 and greater. Notify the provider if you develop any pus like drainage from incision sites or increase pain unrelieved with ordered pain meds.

## 2023-04-01 NOTE — DISCHARGE NOTE NURSING/CASE MANAGEMENT/SOCIAL WORK - NSDCPEFALRISK_GEN_ALL_CORE
For information on Fall & Injury Prevention, visit: [email or web address] OR  [email or web address] OR  https://www.cdc.gov/steadi/patient.html

## 2023-04-01 NOTE — PROGRESS NOTE ADULT - ASSESSMENT
This 32y female, s/p (3/27) exploratory laparotomy, left ovarian cystectomy, EBL 50cc. Post op was c/b RRT on POD#0 for acute blood loss anemia and hypotension with RTOR (3/28) for exploratory laparotomy with evacuation of 500cc of hemoperitoneum and cauterization of left ovarian.  Patient received 4u PRBC,1u Plt, and 1u FFP. CT Angio showed small volume hemoperitoneum, likely reflective of post operative state, and stomach enlargement with heterogenous contents, c/f possible upper GI bleed. . Pt is s/p EGD (3/31) for melena, no acute bleeding was noted. Patient remains w/ stable vitals and stable H/H at this time. Clinically well appearing.     CV/Heme: stable H/H over 48h, stable coags  Pulm: O2 sat wnl on RA, encourage incentive spirometry use  GI: s/p EGD, continue with Protonix and Pepcid, Zofran for nausea. regular diet resumed   : voiding without dysuria  ID: afebrile, no signs of infection, wbc stable  Endo: follow up am electrolyte  DVT prophylaxis: on hold 2/2 acute anemia. scds when in bed, ambulation  Pain Management: controlled on current regimen  d/w attending and fellow morning rounds  -encourage ambulation and OOB to chair  -encourage incentive spirometry  -possible d/c planning for home later today if am labs are improving.   continue with current care    Columba Sheridan, PAC  #[id number] spectra

## 2023-04-01 NOTE — PROGRESS NOTE ADULT - PROBLEM SELECTOR PLAN 1
GYN ONC Fellow Addendum:    Pt seen and examined at bedside. Agree with above. Overnight pain improved, not requiring additional narcotics. Denies dizziness, palpitations, N/V this AM.     VS reviewed  Labs reviewed    C/f GI bleed: Cont high dose PPI, NPO/IVF for now. Cont to trend H/H q6hr, stable over most recent draws and VS including orthostatics remain stable. Awaiting GI consultation recs.  Continue current pain regimen  Encourage ambulation and IS use  Replete lytes prn  DVT ppx: Venodynes  Dispo: cont inpt care    Kamilla Lion MD
GYN ONC Fellow Addendum:    Pt seen and examined at bedside. Agree with above. Pain well controlled. Tolerating reg diet, last BM yesterday very dark/black. No N/V. Ambulating.    VS reviewed  Labs reviewed    C/f GI bleed: H/H stable over several draws. EGD 3/31 w/o obvious e/o GI bleed. Clinically still w/ dark stools. Cont PPI at home. Plan for outpt GI f/u, need for f/u d/w pt  Continue current pain regimen  Tolerating reg diet  Encourage ambulation and IS use  Replete lytes prn  DVT ppx: Venodynes  Dispo: d/c home today    Kamilla Lion MD
GYN ONC Fellow Addendum:    Pt seen and examined at bedside. Agree with above. Pain well controlled. Awaiting EGD this AM.    VS reviewed  Labs reviewed    C/f GI bleed: Cont PPI, cont CLD. Plan for EGD today. Trend H/H, stable over most recent draws and VS. Cont dark stools.  Continue current pain regimen  Encourage ambulation and IS use  Replete lytes prn  DVT ppx: Venodynes  Dispo: cont inpt care    Kamilla Lion MD
GYN ONC Fellow Addendum:    Pt seen and examined at bedside. Agree with above. Pain well controlled. Tolerating CLD, no N/V. +flatus, no BM yet. Has been OOB.    VS reviewed  Labs reviewed    C/f GI bleed: Cont high dose PPI, cont CLD. Cont to trend H/H, stable over most recent draws and VS including orthostatics remain stable. GI w/ low suspicion for GI bleed. Will cont to tx as no other clear source of bleeding and concern on imaging.  Continue current pain regimen  Encourage ambulation and IS use  Replete lytes prn  DVT ppx: Venodynes, SQH if hgb stable  Dispo: cont inpt care    Kamilla Lion MD
GYN ONC Fellow Addendum:    Pt seen and examined at bedside. Agree with above. Overnight events as noted. This AM pain well controlled, no N/V. Remains NPO w/ hauser in situ.    VS reviewed  Labs reviewed    Acute blood loss anemia: now s/p 2u pRBCs. Post op cbc appropriate, will repeat later this AM to confirm stability. D/c hauser and advance diet pending repeat CBC  Continue current pain regimen  Encourage ambulation and IS use  Replete lytes prn  DVT ppx: Venodynes  Dispo: cont inpt post op care    Kamilla Lion MD

## 2023-04-01 NOTE — DISCHARGE NOTE NURSING/CASE MANAGEMENT/SOCIAL WORK - PATIENT PORTAL LINK FT
You can access the FollowMyHealth Patient Portal offered by [facility] by registering at the following website: [email or web address]. By joining [employer]’s FollowMyHealth portal, you will also be able to view your health information using other applications (apps) compatible with our system.

## 2023-04-03 ENCOUNTER — NON-APPOINTMENT (OUTPATIENT)
Age: 33
End: 2023-04-03

## 2023-04-04 LAB — SURGICAL PATHOLOGY STUDY: SIGNIFICANT CHANGE UP

## 2023-04-09 DIAGNOSIS — R31.9 HEMATURIA, UNSPECIFIED: ICD-10-CM

## 2023-04-12 ENCOUNTER — NON-APPOINTMENT (OUTPATIENT)
Age: 33
End: 2023-04-12

## 2023-04-12 LAB
APPEARANCE: ABNORMAL
BACTERIA: NEGATIVE
BILIRUBIN URINE: ABNORMAL
BLOOD URINE: ABNORMAL
COLOR: ABNORMAL
GLUCOSE QUALITATIVE U: NEGATIVE
HYALINE CASTS: 5 /LPF
KETONES URINE: NORMAL
LEUKOCYTE ESTERASE URINE: NEGATIVE
MICROSCOPIC-UA: NORMAL
NITRITE URINE: POSITIVE
PH URINE: 6.5
PROTEIN URINE: ABNORMAL
RED BLOOD CELLS URINE: 14 /HPF
SPECIFIC GRAVITY URINE: 1.02
SQUAMOUS EPITHELIAL CELLS: 10 /HPF
URINE COMMENTS: NORMAL
UROBILINOGEN URINE: NORMAL
WHITE BLOOD CELLS URINE: 5 /HPF

## 2023-04-12 RX ORDER — SULFAMETHOXAZOLE AND TRIMETHOPRIM 800; 160 MG/1; MG/1
800-160 TABLET ORAL TWICE DAILY
Qty: 14 | Refills: 0 | Status: ACTIVE | COMMUNITY
Start: 2023-04-12 | End: 1900-01-01

## 2023-04-14 ENCOUNTER — NON-APPOINTMENT (OUTPATIENT)
Age: 33
End: 2023-04-14

## 2023-04-14 LAB — BACTERIA UR CULT: ABNORMAL

## 2023-04-14 RX ORDER — CIPROFLOXACIN HYDROCHLORIDE 500 MG/1
500 TABLET, FILM COATED ORAL
Qty: 14 | Refills: 0 | Status: ACTIVE | COMMUNITY
Start: 2023-04-14 | End: 1900-01-01

## 2023-04-14 RX ORDER — CEPHALEXIN 500 MG/1
500 CAPSULE ORAL 4 TIMES DAILY
Qty: 28 | Refills: 0 | Status: ACTIVE | COMMUNITY
Start: 2023-04-14 | End: 1900-01-01

## 2023-04-15 ENCOUNTER — NON-APPOINTMENT (OUTPATIENT)
Age: 33
End: 2023-04-15

## 2023-04-17 PROBLEM — R19.00 PELVIC MASS: Status: ACTIVE | Noted: 2023-03-10

## 2023-04-19 ENCOUNTER — APPOINTMENT (OUTPATIENT)
Dept: GYNECOLOGIC ONCOLOGY | Facility: CLINIC | Age: 33
End: 2023-04-19
Payer: COMMERCIAL

## 2023-04-19 VITALS
HEIGHT: 62 IN | TEMPERATURE: 97.6 F | DIASTOLIC BLOOD PRESSURE: 69 MMHG | BODY MASS INDEX: 18.58 KG/M2 | WEIGHT: 101 LBS | HEART RATE: 99 BPM | SYSTOLIC BLOOD PRESSURE: 108 MMHG

## 2023-04-19 DIAGNOSIS — R19.00 INTRA-ABDOMINAL AND PELVIC SWELLING, MASS AND LUMP, UNSPECIFIED SITE: ICD-10-CM

## 2023-04-19 PROCEDURE — 99024 POSTOP FOLLOW-UP VISIT: CPT

## 2023-07-20 NOTE — PRE-OP CHECKLIST - HAND OFF
Bed/Stretcher in lowest position, wheels locked, appropriate side rails in place/Call bell, personal items and telephone in reach/Instruct patient to call for assistance before getting out of bed/chair/stretcher/Non-slip footwear applied when patient is off stretcher/[place] to call system/Physically safe environment - no spills, clutter or unnecessary equipment/Purposeful proactive rounding/Room/bathroom lighting operational, light cord in reach
Unit RN to OR RN

## 2023-11-03 NOTE — OB RN PATIENT PROFILE - FUNCTIONAL ASSESSMENT - DAILY ACTIVITY 2.
Addended by: ARACELIS CORTEZ on: 11/3/2023 08:00 AM     Modules accepted: Orders     4 = No assist / stand by assistance

## (undated) DEVICE — DRSG MEDIPORE + PAD 3.5X10"

## (undated) DEVICE — SUT NOVAFIL 2 60" T-60

## (undated) DEVICE — BIOPSY FORCEP COLD DISP

## (undated) DEVICE — GLV 6.5 PROTEXIS (BLUE)

## (undated) DEVICE — GOWN XL

## (undated) DEVICE — SALIVA EJECTOR (BLUE)

## (undated) DEVICE — SUT VICRYL 3-0 27" SH UNDYED

## (undated) DEVICE — ELCTR ECG CONDUCTIVE ADHESIVE

## (undated) DEVICE — DRSG STERISTRIPS 0.5 X 4"

## (undated) DEVICE — DRSG BANDAID 0.75X3"

## (undated) DEVICE — DRAPE INSTRUMENT POUCH 6.75" X 11"

## (undated) DEVICE — DRSG MASTISOL

## (undated) DEVICE — Device

## (undated) DEVICE — DENTURE CUP PINK

## (undated) DEVICE — LIGASURE IMPACT

## (undated) DEVICE — LUBRICATING JELLY HR ONE SHOT 3G

## (undated) DEVICE — SUT VICRYL 2-0 27" SH UNDYED

## (undated) DEVICE — BASIN SET SINGLE

## (undated) DEVICE — LAP PAD 18 X 18"

## (undated) DEVICE — DRSG TELFA 3 X 8

## (undated) DEVICE — TUBING IV SET GRAVITY 3Y 100" MACRO

## (undated) DEVICE — SUT VICRYL 0 18" TIES UNDYED

## (undated) DEVICE — CATH IV SAFE BC 22G X 1" (BLUE)

## (undated) DEVICE — CONTAINER FORMALIN 80ML YELLOW

## (undated) DEVICE — SOL IRR POUR H2O 250ML

## (undated) DEVICE — PACK MAJOR ABDOMINAL WITH LAP

## (undated) DEVICE — SOL IRR POUR NS 0.9% 500ML

## (undated) DEVICE — LINE BREATHE SAMPLNG

## (undated) DEVICE — TUBING MEDI-VAC W MAXIGRIP CONNECTORS 1/4"X6'

## (undated) DEVICE — GLV 6.5 PROTEXIS W HYDROGEL

## (undated) DEVICE — BITE BLOCK ADULT 20 X 27MM (GREEN)

## (undated) DEVICE — SUT VICRYL 0 36" CT-1 UNDYED

## (undated) DEVICE — WARMING BLANKET UPPER ADULT

## (undated) DEVICE — DRAPE 3/4 SHEET 52X76"

## (undated) DEVICE — POSITIONER FOAM EGG CRATE ULNAR 2PCS (PINK)

## (undated) DEVICE — PACK PERI GYN

## (undated) DEVICE — FOLEY TRAY 16FR 5CC LF UMETER CLOSED

## (undated) DEVICE — SYR ASEPTO

## (undated) DEVICE — BIPOLAR FORCEP KIRWAN JEWELERS STR 4" X 0.4MM W 12FT CORD (GREEN)

## (undated) DEVICE — PACK IV START WITH CHG

## (undated) DEVICE — MEDICINE CUP WITH LID 60ML

## (undated) DEVICE — DRSG DERMABOND PRINEO 60CM

## (undated) DEVICE — CANISTER DISPOSABLE THIN WALL 3000CC

## (undated) DEVICE — ELCTR REM POLYHESIVE ADULT PT RETURN 15FT

## (undated) DEVICE — BASIN EMESIS 10IN GRADUATED MAUVE

## (undated) DEVICE — GOWN LG

## (undated) DEVICE — PREP BETADINE SPONGE STICKS

## (undated) DEVICE — SPONGE PEANUT AUTO COUNT

## (undated) DEVICE — SUT QUILL MONODERM 3-0 30CM PS-2

## (undated) DEVICE — ELCTR BOVIE BLADE 3/4" EXTENDED LENGTH 6"

## (undated) DEVICE — BIOPSY FORCEP RADIAL JAW 4 STANDARD WITH NEEDLE

## (undated) DEVICE — DRSG 2X2

## (undated) DEVICE — DRSG DERMABOND PRINEO 22CM

## (undated) DEVICE — VENODYNE/SCD SLEEVE CALF MEDIUM

## (undated) DEVICE — UNDERPAD LINEN SAVER 17 X 24"

## (undated) DEVICE — CLAMP BX HOT RAD JAW 3

## (undated) DEVICE — DRSG CURITY GAUZE SPONGE 4 X 4" 12-PLY NON-STERILE